# Patient Record
Sex: FEMALE | Race: WHITE | Employment: PART TIME | URBAN - METROPOLITAN AREA
[De-identification: names, ages, dates, MRNs, and addresses within clinical notes are randomized per-mention and may not be internally consistent; named-entity substitution may affect disease eponyms.]

---

## 2017-01-04 ENCOUNTER — OFFICE VISIT (OUTPATIENT)
Dept: OBGYN | Facility: CLINIC | Age: 57
End: 2017-01-04
Payer: COMMERCIAL

## 2017-01-04 VITALS
SYSTOLIC BLOOD PRESSURE: 118 MMHG | HEART RATE: 92 BPM | BODY MASS INDEX: 19.84 KG/M2 | HEIGHT: 63 IN | DIASTOLIC BLOOD PRESSURE: 68 MMHG | WEIGHT: 112 LBS

## 2017-01-04 DIAGNOSIS — Z90.710 S/P COMPLETE HYSTERECTOMY: ICD-10-CM

## 2017-01-04 DIAGNOSIS — R87.620 ASCUS WITH POSITIVE HIGH RISK HUMAN PAPILLOMAVIRUS OF VAGINA: Primary | ICD-10-CM

## 2017-01-04 DIAGNOSIS — Z98.890 S/P LEEP OF CERVIX: ICD-10-CM

## 2017-01-04 DIAGNOSIS — R87.811 ASCUS WITH POSITIVE HIGH RISK HUMAN PAPILLOMAVIRUS OF VAGINA: Primary | ICD-10-CM

## 2017-01-04 PROCEDURE — 57420 EXAM OF VAGINA W/SCOPE: CPT | Performed by: OBSTETRICS & GYNECOLOGY

## 2017-01-04 NOTE — NURSING NOTE
"Initial /68 mmHg  Pulse 92  Ht 5' 3\" (1.6 m)  Wt 112 lb (50.803 kg)  BMI 19.84 kg/m2  LMP 03/17/2008 Estimated body mass index is 19.84 kg/(m^2) as calculated from the following:    Height as of this encounter: 5' 3\" (1.6 m).    Weight as of this encounter: 112 lb (50.803 kg). .      "

## 2017-01-04 NOTE — PROGRESS NOTES
Lauren Tam is a 56 year old female who presents for repeat colposcopy, referred by Dr Harrison. Pap smear 2 months ago showed: ASC-US with HR HPV non- 16/18. The prior pap showed ASC-US with HR HPV 18 plus non 16.     Past Medical History   Diagnosis Date     Excessive or frequent menstruation      Cervical high risk HPV (human papillomavirus) test positive 1/16/2015 1/9/2015:Pap--NIL, +HR HPV type 18 with an additional HR HPV type identified as well. Plan [place]-      H/O colposcopy with cervical biopsy 6/5/2015     Bx - benign, ECC - suspicious for HSIL     Syncope      Depression      ASCUS with positive high risk HPV cervical 12/2/16     Family History   Problem Relation Age of Onset     Prostate Cancer Father      Aneurysm Father      addominal     HEART DISEASE Paternal Grandmother      Heart Failure Paternal Grandmother      CEREBROVASCULAR DISEASE Paternal Grandfather      Family History Negative Mother      Liver Disease Maternal Grandfather      HEART DISEASE Brother      HEART DISEASE Sister      HEART DISEASE Son      HEART DISEASE Daughter      HEART DISEASE Brother      HEART DISEASE Sister      HEART DISEASE Sister      HEART DISEASE Son        Previous history of abnormal paps?: Yes   History of cryotherapy (freezing)?: : No LEEP preceeded hysterectomy  History of veneral diseases: : No  Do you desire testing for any of these diseases? : No  History of genital warts:  No  Visible warts now?:  No  Previously treated? If so, how?:  No     Patient's last menstrual period was 03/17/2008.  Type of contraception: status post hysterectomy  Age at first sexual intercourse: 17  Number of sexual partners (lifetime): 10   History   Smoking status     Former Smoker -- 6 years     Types: Cigarettes     Quit date: 01/01/1984   Smokeless tobacco     Never Used     Comment: 23 years ago quit date     History of sexual abuse:  No  Allergies as of 01/04/2017 - reviewed 01/04/2017   Allergen Reaction Noted      Nkda [no known drug allergies]  03/02/2011        PROCEDURE:  Before the procedure, it was ensured that the patient was educated regarding the nature of her findings to date, the implications of them, and what was to be done. She has been made aware of the role of HPV, the natural history of infection, ways to minimize her future risk, the effect of HPV on the cervix, and treatment options available should they be indicated. The   pathophysiology of the cervix, including a discussion of squamous vs. endometrial cells, and squamous metaplasia have all been reviewed, using illustrations and sketches. The details of the colposcopic procedure were reviewed, as well as the risks of missed diagnoses, pain, infection and bleeding. All questions were answered before proceeding, and informed consent was therefore obtained.    Bimanual examination: was not done  Unenhanced examination of the cervix was normal without lesions.  Pap smear and endocervical sampling not obtained due to:    not due  Please refer to images section for details!  Pap repeated?:  No  SCJ seen?: s/p hysterectomy  ECC done?:  No  Lugol's solution used?:  Yes   Satisfactory examination?:  yes    Vaginal vault: normal to cursory inspection yes  Urethra normal?:  yes  Labia normal?:  yes  Perineum normal?:  yes  Rectum normal?:  yes    FINDINGS:  Please see image   Cervix: Absent  Vagina: no visible lesions and no concerning findings  Procedure: Colposcopy only.    Procedure summary: Patient tolerated procedure well     Assessment: Normal exam without visible pathology  (R87.620,  R87.811) ASCUS with positive high risk human papillomavirus of vagina  (primary encounter diagnosis)  She has had HPV 18 in the past that is now absent  (Z98.890) S/P LEEP of cervix  (Z90.710) S/P complete hysterectomy  Comment: [facility]  Plan: pap + HPV in 1 year        Plan: Pap and HPV in 1 year    Anurag Real

## 2017-01-17 ENCOUNTER — RADIANT APPOINTMENT (OUTPATIENT)
Dept: GENERAL RADIOLOGY | Facility: CLINIC | Age: 57
End: 2017-01-17
Attending: NURSE PRACTITIONER
Payer: COMMERCIAL

## 2017-01-17 ENCOUNTER — OFFICE VISIT (OUTPATIENT)
Dept: FAMILY MEDICINE | Facility: CLINIC | Age: 57
End: 2017-01-17
Payer: COMMERCIAL

## 2017-01-17 VITALS
TEMPERATURE: 98.5 F | BODY MASS INDEX: 19.42 KG/M2 | WEIGHT: 109.6 LBS | SYSTOLIC BLOOD PRESSURE: 100 MMHG | DIASTOLIC BLOOD PRESSURE: 66 MMHG | HEART RATE: 68 BPM

## 2017-01-17 DIAGNOSIS — K59.00 CONSTIPATION, UNSPECIFIED CONSTIPATION TYPE: Primary | ICD-10-CM

## 2017-01-17 LAB
ALBUMIN SERPL-MCNC: 3.6 G/DL (ref 3.4–5)
ALP SERPL-CCNC: 61 U/L (ref 40–150)
ALT SERPL W P-5'-P-CCNC: 16 U/L (ref 0–50)
ANION GAP SERPL CALCULATED.3IONS-SCNC: 4 MMOL/L (ref 3–14)
AST SERPL W P-5'-P-CCNC: 17 U/L (ref 0–45)
BASOPHILS # BLD AUTO: 0 10E9/L (ref 0–0.2)
BASOPHILS NFR BLD AUTO: 0.4 %
BILIRUB SERPL-MCNC: 0.5 MG/DL (ref 0.2–1.3)
BUN SERPL-MCNC: 14 MG/DL (ref 7–30)
CALCIUM SERPL-MCNC: 8.7 MG/DL (ref 8.5–10.1)
CHLORIDE SERPL-SCNC: 104 MMOL/L (ref 94–109)
CO2 SERPL-SCNC: 30 MMOL/L (ref 20–32)
CREAT SERPL-MCNC: 0.84 MG/DL (ref 0.52–1.04)
DIFFERENTIAL METHOD BLD: NORMAL
EOSINOPHIL # BLD AUTO: 0.1 10E9/L (ref 0–0.7)
EOSINOPHIL NFR BLD AUTO: 1.5 %
ERYTHROCYTE [DISTWIDTH] IN BLOOD BY AUTOMATED COUNT: 12.5 % (ref 10–15)
GFR SERPL CREATININE-BSD FRML MDRD: 69 ML/MIN/1.7M2
GLUCOSE SERPL-MCNC: 98 MG/DL (ref 70–99)
HCT VFR BLD AUTO: 45.2 % (ref 35–47)
HGB BLD-MCNC: 14.9 G/DL (ref 11.7–15.7)
LYMPHOCYTES # BLD AUTO: 2.1 10E9/L (ref 0.8–5.3)
LYMPHOCYTES NFR BLD AUTO: 30.4 %
MCH RBC QN AUTO: 31.2 PG (ref 26.5–33)
MCHC RBC AUTO-ENTMCNC: 33 G/DL (ref 31.5–36.5)
MCV RBC AUTO: 95 FL (ref 78–100)
MONOCYTES # BLD AUTO: 0.5 10E9/L (ref 0–1.3)
MONOCYTES NFR BLD AUTO: 6.7 %
NEUTROPHILS # BLD AUTO: 4.2 10E9/L (ref 1.6–8.3)
NEUTROPHILS NFR BLD AUTO: 61 %
PLATELET # BLD AUTO: 228 10E9/L (ref 150–450)
POTASSIUM SERPL-SCNC: 4.4 MMOL/L (ref 3.4–5.3)
PROT SERPL-MCNC: 6.6 G/DL (ref 6.8–8.8)
RBC # BLD AUTO: 4.78 10E12/L (ref 3.8–5.2)
SODIUM SERPL-SCNC: 138 MMOL/L (ref 133–144)
WBC # BLD AUTO: 6.8 10E9/L (ref 4–11)

## 2017-01-17 PROCEDURE — 74020 XR ABDOMEN 2 VW: CPT

## 2017-01-17 PROCEDURE — 85025 COMPLETE CBC W/AUTO DIFF WBC: CPT | Performed by: NURSE PRACTITIONER

## 2017-01-17 PROCEDURE — 99214 OFFICE O/P EST MOD 30 MIN: CPT | Performed by: NURSE PRACTITIONER

## 2017-01-17 PROCEDURE — 36415 COLL VENOUS BLD VENIPUNCTURE: CPT | Performed by: NURSE PRACTITIONER

## 2017-01-17 PROCEDURE — 80053 COMPREHEN METABOLIC PANEL: CPT | Performed by: NURSE PRACTITIONER

## 2017-01-17 NOTE — PATIENT INSTRUCTIONS
Start with a fleets enema    Then start Miralax 1 capful every night until clear     Lots of fluids     Will get labs today and notify you of results     Return to clinic if symptoms not improved in a week       Constipation (Adult)  Constipation means that you have bowel movements that are less frequent than usual. Stools often become very hard and difficult to pass.  Constipation is very common. At some point in life it affects almost everyone. Since everyone's bowel habits are different, what is constipation to one person may not be to another. Your healthcare provider may do tests to diagnose constipation. It depends on what he or she finds when evaluating you.    Symptoms of constipation include:    Abdominal pain    Bloating    Vomiting    Painful bowel movements    Itching, swelling, bleeding, or pain around the anus  Causes  Constipation can have many causes. These include:    Diet low in fiber    Too much dairy    Not drinking enough liquids    Lack of exercise or physical activity. This is especially true for older adults.    Changes in lifestyle or daily routine, including pregnancy, aging, work, and travel    Frequent use or misuse of laxatives    Ignoring the urge to have a bowel movement or delaying it until later    Medicines, such as certain prescription pain medicines, iron supplements, antacids, certain antidepressants, and calcium supplements    Diseases like irritable bowel syndrome, bowel obstructions, stroke, diabetes, thyroid disease, Parkinson disease, hemorrhoids, and colon cancer  Complications  Potential complications of constipation can include:    Hemorrhoids    Rectal bleeding from hemorrhoids or anal fissures (skin tears)    Hernias    Dependency on laxatives    Chronic constipation    Fecal impaction    Bowel obstruction or perforation  Home care  All treatment should be done after talking with your healthcare provider. This is especially true if you have another medical problems, are  taking prescription medicines, or are an older adult. Treatment most often involves lifestyle changes. You may also need medicines. Your healthcare provider will tell you which will work best for you. Follow the advice below to help avoid this problem in the future.  Lifestyle changes  These lifestyle changes can help prevent constipation:    Diet. Eat a high-fiber diet, with fresh fruit and vegetables, and reduce dairy intake, meats, and processed foods    Fluids. It's important to get enough fluids each day. Drink plenty of water when you eat more fiber. If you are on diet that limits the amount of fluid you can have, talk about this with your healthcare provider.    Regular exercise. Check with your healthcare provider first.  Medications  Take any medicines as directed. Some laxatives are safe to use only every now and then. Others can be taken on a regular basis. Talk with your doctor or pharmacist if you have questions.  Prescription pain medicines can cause constipation. If you are taking this kind of medicine, ask your healthcare provider if you should also take a stool softener.  Medicines you may take to treat constipation include:    Fiber supplements    Stool softeners    Laxatives    Enemas    Rectal suppositories  Follow-up care  Follow up with your healthcare provider if symptoms don't get better in the next few days. You may need to have more tests or see a specialist.  Call 911  Call 911 if any of these occur:    Trouble breathing    Stiff, rigid abdomen that is severely painful to touch    Confusion    Fainting or loss of consciousness    Rapid heart rate    Chest pain  When to seek medical advice  Call your healthcare provider right away if any of these occur:    Fever over 100.4 F (38 C)    Failure to resume normal bowel movements    Pain in your abdomen or back gets worse    Nausea or vomiting    Swelling in your abdomen    Blood in the stool    Black, tarry stool    Involuntary weight  loss    Weakness    8300-4343 [employer]. [address] [place] [address], [place] [address]. All rights reserved. This information is not intended as a substitute for professional medical care. Always follow your healthcare professional's instructions.        Treating Constipation  Constipation is a common and often uncomfortable problem. Constipation means you have bowel movements fewer than three times per week, or strain to pass hard, dry stool. It can last a short time. Or it can be a problem that never seems to go away. The good news is that it can often be treated and controlled.    Eat more fiber  One of the best ways to help treat constipation is to increase your fiber intake. You can do this either through diet or by using fiber supplements. Fiber (in whole grains, fruits, and vegetables) adds bulk and absorbs water to soften the stool. This helps the stool pass through the colon more easily. When you increase your fiber intake, do it slowly to avoid side effects such as bloating. Also increase the amount of water that you drink. Eating more of the following foods can add fiber to your diet.    High-fiber cereals    Whole grains, bran, and brown rice    Vegetables such as carrots, broccoli, and greens    Fresh fruits (especially apples, pears, and dried fruits like raisins and apricots)    Nuts and legumes (especially beans such as lentils, kidney beans, and lima beans)  Get physically active  Exercise helps improve the working of your colon which helps ease constipation. Try to get some physical activity every day. If you haven t been active for a while, talk to your health care provider before starting again.  Laxatives  Your health care provider may suggest an over-the-counter product to help ease your constipation. He or she may suggest the use of bulk-forming agents or laxatives. The use of laxatives, if used as directed, is common and safe. Follow directions carefully when using them. See your  health care provider for new-onset constipation, to rule out other causes such as medications or thyroid disease.    2450-7905 [employer]. [address] [place] [address], [place] [address]. All rights reserved. This information is not intended as a substitute for professional medical care. Always follow your healthcare professional's instructions.

## 2017-01-17 NOTE — MR AVS SNAPSHOT
After Visit Summary   1/17/2017    Lauren Tam    MRN: [id number]           Patient Information     Date Of Birth          1960        Visit Information        Provider Department      1/17/2017 11:00 AM Divya Sesay APRN [facility]        Today's Diagnoses     Constipation, unspecified constipation type    -  1     Diarrhea, unspecified type           Care Instructions    Start with a fleets enema    Then start Miralax 1 capful every night until clear     Lots of fluids     Will get labs today and notify you of results     Return to clinic if symptoms not improved in a week       Constipation (Adult)  Constipation means that you have bowel movements that are less frequent than usual. Stools often become very hard and difficult to pass.  Constipation is very common. At some point in life it affects almost everyone. Since everyone's bowel habits are different, what is constipation to one person may not be to another. Your healthcare provider may do tests to diagnose constipation. It depends on what he or she finds when evaluating you.    Symptoms of constipation include:    Abdominal pain    Bloating    Vomiting    Painful bowel movements    Itching, swelling, bleeding, or pain around the anus  Causes  Constipation can have many causes. These include:    Diet low in fiber    Too much dairy    Not drinking enough liquids    Lack of exercise or physical activity. This is especially true for older adults.    Changes in lifestyle or daily routine, including pregnancy, aging, work, and travel    Frequent use or misuse of laxatives    Ignoring the urge to have a bowel movement or delaying it until later    Medicines, such as certain prescription pain medicines, iron supplements, antacids, certain antidepressants, and calcium supplements    Diseases like irritable bowel syndrome, bowel obstructions, stroke, diabetes, thyroid disease, Parkinson disease, hemorrhoids, and  colon cancer  Complications  Potential complications of constipation can include:    Hemorrhoids    Rectal bleeding from hemorrhoids or anal fissures (skin tears)    Hernias    Dependency on laxatives    Chronic constipation    Fecal impaction    Bowel obstruction or perforation  Home care  All treatment should be done after talking with your healthcare provider. This is especially true if you have another medical problems, are taking prescription medicines, or are an older adult. Treatment most often involves lifestyle changes. You may also need medicines. Your healthcare provider will tell you which will work best for you. Follow the advice below to help avoid this problem in the future.  Lifestyle changes  These lifestyle changes can help prevent constipation:    Diet. Eat a high-fiber diet, with fresh fruit and vegetables, and reduce dairy intake, meats, and processed foods    Fluids. It's important to get enough fluids each day. Drink plenty of water when you eat more fiber. If you are on diet that limits the amount of fluid you can have, talk about this with your healthcare provider.    Regular exercise. Check with your healthcare provider first.  Medications  Take any medicines as directed. Some laxatives are safe to use only every now and then. Others can be taken on a regular basis. Talk with your doctor or pharmacist if you have questions.  Prescription pain medicines can cause constipation. If you are taking this kind of medicine, ask your healthcare provider if you should also take a stool softener.  Medicines you may take to treat constipation include:    Fiber supplements    Stool softeners    Laxatives    Enemas    Rectal suppositories  Follow-up care  Follow up with your healthcare provider if symptoms don't get better in the next few days. You may need to have more tests or see a specialist.  Call 911  Call 911 if any of these occur:    Trouble breathing    Stiff, rigid abdomen that is severely  painful to touch    Confusion    Fainting or loss of consciousness    Rapid heart rate    Chest pain  When to seek medical advice  Call your healthcare provider right away if any of these occur:    Fever over 100.4 F (38 C)    Failure to resume normal bowel movements    Pain in your abdomen or back gets worse    Nausea or vomiting    Swelling in your abdomen    Blood in the stool    Black, tarry stool    Involuntary weight loss    Weakness    2317-4790 [employer]. [address] [place] [address], [place] [address]. All rights reserved. This information is not intended as a substitute for professional medical care. Always follow your healthcare professional's instructions.        Treating Constipation  Constipation is a common and often uncomfortable problem. Constipation means you have bowel movements fewer than three times per week, or strain to pass hard, dry stool. It can last a short time. Or it can be a problem that never seems to go away. The good news is that it can often be treated and controlled.    Eat more fiber  One of the best ways to help treat constipation is to increase your fiber intake. You can do this either through diet or by using fiber supplements. Fiber (in whole grains, fruits, and vegetables) adds bulk and absorbs water to soften the stool. This helps the stool pass through the colon more easily. When you increase your fiber intake, do it slowly to avoid side effects such as bloating. Also increase the amount of water that you drink. Eating more of the following foods can add fiber to your diet.    High-fiber cereals    Whole grains, bran, and brown rice    Vegetables such as carrots, broccoli, and greens    Fresh fruits (especially apples, pears, and dried fruits like raisins and apricots)    Nuts and legumes (especially beans such as lentils, kidney beans, and lima beans)  Get physically active  Exercise helps improve the working of your colon which helps ease constipation. Try to  get some physical activity every day. If you haven t been active for a while, talk to your health care provider before starting again.  Laxatives  Your health care provider may suggest an over-the-counter product to help ease your constipation. He or she may suggest the use of bulk-forming agents or laxatives. The use of laxatives, if used as directed, is common and safe. Follow directions carefully when using them. See your health care provider for new-onset constipation, to rule out other causes such as medications or thyroid disease.    6449-8970 [employer]. [address] [place] [address]. All rights reserved. This information is not intended as a substitute for professional medical care. Always follow your healthcare professional's instructions.              Follow-ups after your visit        Who to contact     If you have questions or need follow up information about today's clinic visit or your schedule please contact [facility] directly at [phone].  Normal or non-critical lab and imaging results will be communicated to you by [employer], letter or phone within 4 business days after the clinic has received the results. If you do not hear from us within 7 days, please contact the clinic through [employer] or phone. If you have a critical or abnormal lab result, we will notify you by phone as soon as possible.  Submit refill requests through [employer] or call your pharmacy and they will forward the refill request to us. Please allow 3 business days for your refill to be completed.          Additional Information About Your Visit        [employer] Information     [employer] gives you secure access to your electronic health record. If you see a primary care provider, you can also send messages to your care team and make appointments. If you have questions, please call your primary care clinic.  If you do not have a primary care provider, please call [phone] and they will  assist you.        Care EveryWhere ID     This is your Care EveryWhere ID. This could be used by other organizations to access your [place] medical records  [id number]        Your Vitals Were     Pulse Temperature Last Period             68 98.5  F (36.9  C) (Tympanic) 03/17/2008          Blood Pressure from Last 3 Encounters:   01/17/17 100/66   01/04/17 118/68   12/02/16 121/76    Weight from Last 3 Encounters:   01/17/17 109 lb 9.6 oz (49.714 kg)   01/04/17 112 lb (50.803 kg)   12/02/16 109 lb (49.442 kg)              We Performed the Following     CBC with platelets and differential     Comprehensive metabolic panel (BMP + Alb, Alk Phos, ALT, AST, Total. Bili, TP)     XR Abdomen 2 Views        Primary Care Provider Office Phone # Fax #    Fausto Gregg [phone]       [facility] [address] [place] [address]        Thank you!     Thank you for choosing [facility]  for your care. Our goal is always to provide you with excellent care. Hearing back from our patients is one way we can continue to improve our services. Please take a few minutes to complete the written survey that you may receive in the mail after your visit with us. Thank you!             Your Updated Medication List - Protect others around you: Learn how to safely use, store and throw away your medicines at www.disposemymeds.org.          This list is accurate as of: 1/17/17 11:44 AM.  Always use your most recent med list.                   Brand Name Dispense Instructions for use    citalopram 20 MG tablet    celeXA    30 tablet    Take 1 tablet (20 mg) by mouth daily       EXCEDRIN PO      Take by mouth as needed       fluticasone 50 MCG/ACT spray    FLONASE    1 g    Spray 1-2 sprays into both nostrils daily       GLUCOSAMINE-CHONDROITIN PO      Take 2 tablets by mouth daily       MULTIVITAMIN TABS   OR      1 TABLET DAILY       NAPROSYN 500 MG tablet   Generic drug:  naproxen       Take 1 tablet (500 mg) by mouth 2 times daily as needed for moderate pain

## 2017-01-17 NOTE — NURSING NOTE
"Chief Complaint   Patient presents with     Headache     Vomiting     Generalized Body Aches     /66 mmHg  Pulse 68  Temp(Src) 98.5  F (36.9  C) (Tympanic)  Wt 109 lb 9.6 oz (49.714 kg)  LMP 03/17/2008 Estimated body mass index is 19.42 kg/(m^2) as calculated from the following:    Height as of 1/4/17: 5' 3\" (1.6 m).    Weight as of this encounter: 109 lb 9.6 oz (49.714 kg).  bp completed using cuff size: regular      Health Maintenance that is potentially due pending provider review:  NONE    N/a  Elijah Cerna M.A.    "

## 2017-01-17 NOTE — PROGRESS NOTES
SUBJECTIVE:                                                    Lauren Tam is a 56 year old female who presents to clinic today for the following health issues:    Concern - Nausea, Diarrhea, headaches     Onset: Thursday     Description:   Patient states she has been vomiting, having diarrhea, headaches and body aches since last Thursday    Intensity: moderate    Progression of Symptoms:  same    Accompanying Signs & Symptoms:  24 hour fever on Thursday. No taste buds and cant smell.  Has not been in contact with anyone that she knows was sick. Some Nausea.  No cramping, light headedness.       Previous history of similar problem:   None    Precipitating factors:   Worsened by: None    Alleviating factors:  Improved by: None        Therapies Tried and outcome: Peptobismal, gatorade.     Started with a headache - had for 24 hours  Temperature was 102 for approximately 24 hours on Thursday then broke   Is able to smell, just not taste  Normal daily bowel movements - last normal last Tuesday/Wednesday  No recent antibiotic use   No abdominal history     Diarrhea is soft/formed  Having approximately 3 a day  No blood in stool - more dark brown      Problem list and histories reviewed & adjusted, as indicated.  Additional history: as documented    Patient Active Problem List   Diagnosis     CARDIOVASCULAR SCREENING; LDL GOAL LESS THAN 160     Sebaceous cyst     S/P LEEP of cervix-CIN2     Advanced directives, counseling/discussion     Syncope     Severe episode of recurrent major depressive disorder, without psychotic features (H)     ASCUS with positive high risk human papillomavirus of vagina     S/P complete hysterectomy     Past Surgical History   Procedure Laterality Date     Surgical history of -   1967     T&A     Surgical history of -        PE Tubes x6 starting 1967     Surgical history of -   1979     Appy     Surgical history of -        D&C     Surgical history of -   1984     Tubal Ligation      Surgical history of -   1987     Arthroscopy (R) Knee     Colonoscopy  3/2/2011     COLONOSCOPY performed by CESAR GONZALEZ at [facility]     Leep tx, cervical  6/23/15     ELAINA 1/2 with indeterminate margin     Laparoscopic hysterectomy total, bilateral salpingo-oophorectomy, combined N/A 10/12/2015     Procedure: COMBINED LAPAROSCOPIC HYSTERECTOMY TOTAL, SALPINGO-OOPHORECTOMY;  Surgeon: Hunter Cruz MD;  Location: [facility]     Cystoscopy N/A 10/12/2015     Procedure: CYSTOSCOPY;  Surgeon: Hunter Cruz MD;  Location: [facility]       Social History   Substance Use Topics     Smoking status: Former Smoker -- 6 years     Types: Cigarettes     Quit date: 01/01/1984     Smokeless tobacco: Never Used      Comment: 23 years ago quit date     Alcohol Use: Yes      Comment: 3 drinks a weekly     Family History   Problem Relation Age of Onset     Prostate Cancer Father      Aneurysm Father      addominal     HEART DISEASE Paternal Grandmother      Heart Failure Paternal Grandmother      CEREBROVASCULAR DISEASE Paternal Grandfather      Family History Negative Mother      Liver Disease Maternal Grandfather      HEART DISEASE Brother      HEART DISEASE Sister      HEART DISEASE Son      HEART DISEASE Daughter      HEART DISEASE Brother      HEART DISEASE Sister      HEART DISEASE Sister      HEART DISEASE Son          Current Outpatient Prescriptions   Medication Sig Dispense Refill     citalopram (CELEXA) 20 MG tablet Take 1 tablet (20 mg) by mouth daily 30 tablet 11     Aspirin-Acetaminophen-Caffeine (EXCEDRIN PO) Take by mouth as needed        GLUCOSAMINE-CHONDROITIN PO Take 2 tablets by mouth daily       naproxen (NAPROSYN) 500 MG tablet Take 1 tablet (500 mg) by mouth 2 times daily as needed for moderate pain       fluticasone (FLONASE) 50 MCG/ACT nasal spray Spray 1-2 sprays into both nostrils daily 1 g 0     MULTIVITAMIN TABS   OR 1 TABLET DAILY       Allergies   Allergen Reactions     Nkda [No  Known Drug Allergies]        ROS:  Constitutional, HEENT, cardiovascular, pulmonary, gi and gu systems are negative, except as otherwise noted.    OBJECTIVE:                                                    /66 mmHg  Pulse 68  Temp(Src) 98.5  F (36.9  C) (Tympanic)  Wt 109 lb 9.6 oz (49.714 kg)  LMP 03/17/2008  Body mass index is 19.42 kg/(m^2).  GENERAL APPEARANCE: healthy, alert and mild distress  RESP: lungs clear to auscultation - no rales, rhonchi or wheezes  CV: regular rates and rhythm, normal S1 S2, no S3 or S4 and no murmur, click or rub  ABDOMEN: soft, tender all quadrants R>L, without hepatosplenomegaly or masses and bowel sounds normal    Diagnostic Test Results:  Results for orders placed or performed in visit on 01/17/17 (from the past 24 hour(s))   CBC with platelets and differential   Result Value Ref Range    WBC 6.8 4.0 - 11.0 10e9/L    RBC Count 4.78 3.8 - 5.2 10e12/L    Hemoglobin 14.9 11.7 - 15.7 g/dL    Hematocrit 45.2 35.0 - 47.0 %    MCV 95 78 - 100 fl    MCH 31.2 26.5 - 33.0 pg    MCHC 33.0 31.5 - 36.5 g/dL    RDW 12.5 10.0 - 15.0 %    Platelet Count 228 150 - 450 10e9/L    Diff Method Automated Method     % Neutrophils 61.0 %    % Lymphocytes 30.4 %    % Monocytes 6.7 %    % Eosinophils 1.5 %    % Basophils 0.4 %    Absolute Neutrophil 4.2 1.6 - 8.3 10e9/L    Absolute Lymphocytes 2.1 0.8 - 5.3 10e9/L    Absolute Monocytes 0.5 0.0 - 1.3 10e9/L    Absolute Eosinophils 0.1 0.0 - 0.7 10e9/L    Absolute Basophils 0.0 0.0 - 0.2 10e9/L          Abdominal x-ray read by Divya Lamb, DAVID CNP  - moderate amount of stool in right colon and scattered through out- discussed with patient  Comprehensive - pending   ASSESSMENT/PLAN:                                                      1. Constipation, unspecified constipation type  Nausea with diarrhea since Thursday, fever x 1 day. No vomiting. No blood in stool. Abdominal xray identifies a moderate amount of stool in right colon and  scattered through out   - XR Abdomen 2 Views  - CBC with platelets and differential  - Comprehensive metabolic panel (BMP + Alb, Alk Phos, ALT, AST, Total. Bili, TP)  - Fleets enema  - Start Miralax nightly until clear  - Push Fluids    See Patient Instructions    DAVID Caldwell [facility]

## 2017-02-02 ENCOUNTER — TELEPHONE (OUTPATIENT)
Dept: FAMILY MEDICINE | Facility: CLINIC | Age: 57
End: 2017-02-02

## 2017-02-02 DIAGNOSIS — J01.90 ACUTE SINUSITIS WITH SYMPTOMS > 10 DAYS: Primary | ICD-10-CM

## 2017-02-02 NOTE — TELEPHONE ENCOUNTER
Reason for call:  Patient reporting a symptom    Symptom or request: poss sinus infection     Duration (how long have symptoms been present): 6 days headache pressure behind eyes nausea no temp    Have you been treated for this before? Yes    Additional comments: very sleepy [place] in [place] [phone] is phone number     Phone Number patient can be reached at:  Home number on file [phone] (home)    Best Time:  soon    Can we leave a detailed message on this number:  YES    Call taken on 2/2/2017 at 10:56 AM by Gwen eRes

## 2017-02-02 NOTE — TELEPHONE ENCOUNTER
RN Sinusitis Treatment Protocol: ages 16 and up  Lauren Tam, a 56 year old female, is having symptoms reviewed for possible sinus infection.    ASSESSMENT/PLAN:  1.  Antibiotic treatment is indicated as onset of symptoms have been more than 10 days.  Amoxicillin-clavulanate 875mg/125mg orally twice daily or 500mg/125mg three times daily for 5 to 7 days.  2.  Education: ibuprofen or acetaminophen as directed on the bottle, over the counter decongestant, Nasal saline rinse, (Neti-pot or a nasal saline spray is preferred to Afrin nasal spray), Afrin nasal spray no longer than 5 days.  3.  Follow-up: Contact provider's triage RN if symptoms do not improve after 3 days of antibiotic treatment or if symptoms return after antibiotic therapy is complete.   4.  Patient verbalized understanding of this plan and is agreeable.    SUBJECTIVE:  Symptoms: Facial pain or pressure over the sinus areas, especially if worse with position change or cough, Nasal congestion and Change in sense of smell or lack of smell.     In addition notes: None   Shortness of breath: NO  Onset of symptoms was 2 week(s) ago.    Treatment measures tried include Fluids, OTC meds and Rest.   Course of illness is worsening.  Predisposing conditions include: None    Complicating Factors and Serious Symptoms:   Patient reports: NONE   Patient denies: Fever > 102.5  Orbital pain  Periorbital swelling or erythema  Severe facial swelling or erythema  Severe neck pain  This being the worse headache the patient has ever experienced  Vision changes  COPD (chronic obstructive pulmonary disease)    ALLERGIES:  Allergies   Allergen Reactions     Nkda [No Known Drug Allergies]      OBJECTIVE:  Weight: 0 lbs 0 oz    Encounter handled by: Nurse Triage.     Connie Oliver RN

## 2017-03-02 ENCOUNTER — OFFICE VISIT (OUTPATIENT)
Dept: FAMILY MEDICINE | Facility: CLINIC | Age: 57
End: 2017-03-02
Payer: COMMERCIAL

## 2017-03-02 ENCOUNTER — RADIANT APPOINTMENT (OUTPATIENT)
Dept: GENERAL RADIOLOGY | Facility: CLINIC | Age: 57
End: 2017-03-02
Attending: PHYSICIAN ASSISTANT
Payer: COMMERCIAL

## 2017-03-02 VITALS
HEART RATE: 80 BPM | WEIGHT: 116.6 LBS | DIASTOLIC BLOOD PRESSURE: 72 MMHG | HEIGHT: 63 IN | TEMPERATURE: 97.8 F | BODY MASS INDEX: 20.66 KG/M2 | SYSTOLIC BLOOD PRESSURE: 116 MMHG

## 2017-03-02 DIAGNOSIS — M79.641 PAIN OF RIGHT HAND: Primary | ICD-10-CM

## 2017-03-02 DIAGNOSIS — M79.641 PAIN OF RIGHT HAND: ICD-10-CM

## 2017-03-02 PROCEDURE — 99214 OFFICE O/P EST MOD 30 MIN: CPT | Performed by: PHYSICIAN ASSISTANT

## 2017-03-02 PROCEDURE — 73130 X-RAY EXAM OF HAND: CPT | Mod: RT

## 2017-03-02 ASSESSMENT — ENCOUNTER SYMPTOMS
NERVOUS/ANXIOUS: 0
DIAPHORESIS: 0
HEARTBURN: 0
BLOOD IN STOOL: 0
PHOTOPHOBIA: 0
ABDOMINAL PAIN: 0
SENSORY CHANGE: 0
TINGLING: 0
DEPRESSION: 0
NEUROLOGICAL NEGATIVE: 1
CONSTIPATION: 0
PALPITATIONS: 0
WEIGHT LOSS: 0
ORTHOPNEA: 0
FREQUENCY: 0
SEIZURES: 0
HEADACHES: 0
SPUTUM PRODUCTION: 0
LOSS OF CONSCIOUSNESS: 0
DOUBLE VISION: 0
COUGH: 0
EYE REDNESS: 0
DIARRHEA: 0
FEVER: 0
DYSURIA: 0
FOCAL WEAKNESS: 0
SHORTNESS OF BREATH: 0
VOMITING: 0
HALLUCINATIONS: 0
SORE THROAT: 0
CHILLS: 0
FALLS: 0
HEMOPTYSIS: 0
DIZZINESS: 0
BACK PAIN: 0
INSOMNIA: 0
WEAKNESS: 0
EYE PAIN: 0
NAUSEA: 0
EYE DISCHARGE: 0
NECK PAIN: 0
BLURRED VISION: 0
WHEEZING: 0
MYALGIAS: 0

## 2017-03-02 ASSESSMENT — LIFESTYLE VARIABLES: SUBSTANCE_ABUSE: 0

## 2017-03-02 NOTE — PROGRESS NOTES
HPI    SUBJECTIVE:                                                    Lauren Tam is a 56 year old female who presents to clinic today for right hand pain and swelling since yesterday. She reports pain started a few hours after returning from work yesterday. Hand become swollen and red two hours later. She reports no pain during rest but 8/10 with movement. She works scanning items all day during work. She has a history of bilateral carpal tunnel and osteoarthritis for which she uses a brace at work. She has been icing hand along with ibuprofen with minimal relief. No fever, numbness, tingling or trauma. Decreased strength due to pain and previous joint problems.       Musculoskeletal problem/pain      Duration: since yesterday afternoon    Description  Location: top of right hand    Intensity:  severe, 8/10    Accompanying signs and symptoms: warmth, swelling and redness    History  Previous similar problem: YES- in right forearm a couple years ago  Previous evaluation:  none    Precipitating or alleviating factors:  Trauma or overuse: YES  Aggravating factors include: using hand    Therapies tried and outcome: ice and NSAID - motrin, brace     Problem list and histories reviewed & adjusted, as indicated.  Additional history:     Patient Active Problem List   Diagnosis     CARDIOVASCULAR SCREENING; LDL GOAL LESS THAN 160     Sebaceous cyst     S/P LEEP of cervix-CIN2     Advanced directives, counseling/discussion     Syncope     Severe episode of recurrent major depressive disorder, without psychotic features (H)     ASCUS with positive high risk human papillomavirus of vagina     S/P complete hysterectomy     Past Surgical History   Procedure Laterality Date     Surgical history of -   1967     T&A     Surgical history of -        PE Tubes x6 starting 1967     Surgical history of -   1979     Appy     Surgical history of -        D&C     Surgical history of -   1984     Tubal Ligation     Surgical history of  - 1987     Arthroscopy (R) Knee     Colonoscopy  3/2/2011     COLONOSCOPY performed by CESAR GONZALEZ at [facility]     Leep tx, cervical  6/23/15     ELAINA 1/2 with indeterminate margin     Laparoscopic hysterectomy total, bilateral salpingo-oophorectomy, combined N/A 10/12/2015     Procedure: COMBINED LAPAROSCOPIC HYSTERECTOMY TOTAL, SALPINGO-OOPHORECTOMY;  Surgeon: Hunter Cruz MD;  Location: [facility]     Cystoscopy N/A 10/12/2015     Procedure: CYSTOSCOPY;  Surgeon: Hunter Cruz MD;  Location: [facility]       Social History   Substance Use Topics     Smoking status: Former Smoker     Years: 6.00     Types: Cigarettes     Quit date: 1/1/1984     Smokeless tobacco: Never Used      Comment: 23 years ago quit date     Alcohol use Yes      Comment: 3 drinks a weekly     Family History   Problem Relation Age of Onset     Prostate Cancer Father      Aneurysm Father      addominal     HEART DISEASE Paternal Grandmother      Heart Failure Paternal Grandmother      CEREBROVASCULAR DISEASE Paternal Grandfather      Family History Negative Mother      Liver Disease Maternal Grandfather      HEART DISEASE Brother      HEART DISEASE Sister      HEART DISEASE Son      HEART DISEASE Daughter      HEART DISEASE Brother      HEART DISEASE Sister      HEART DISEASE Sister      HEART DISEASE Son          Current Outpatient Prescriptions   Medication Sig Dispense Refill     citalopram (CELEXA) 20 MG tablet Take 1 tablet (20 mg) by mouth daily 30 tablet 11     Aspirin-Acetaminophen-Caffeine (EXCEDRIN PO) Take by mouth as needed        GLUCOSAMINE-CHONDROITIN PO Take 2 tablets by mouth daily       naproxen (NAPROSYN) 500 MG tablet Take 1 tablet (500 mg) by mouth 2 times daily as needed for moderate pain       fluticasone (FLONASE) 50 MCG/ACT nasal spray Spray 1-2 sprays into both nostrils daily 1 g 0     MULTIVITAMIN TABS   OR 1 TABLET DAILY       Allergies   Allergen Reactions     Nkda [No Known Drug Allergies]       BP Readings from Last 3 Encounters:   03/02/17 116/72   01/17/17 100/66   01/04/17 118/68    Wt Readings from Last 3 Encounters:   03/02/17 116 lb 9.6 oz (52.9 kg)   01/17/17 109 lb 9.6 oz (49.7 kg)   01/04/17 112 lb (50.8 kg)            Labs reviewed in EPIC    Reviewed and updated as needed this visit by clinical staff    Reviewed and updated as needed this visit by Provider      Review of Systems   Constitutional: Negative for chills, diaphoresis, fever, malaise/fatigue and weight loss.   HENT: Negative for congestion, ear discharge, ear pain, hearing loss, nosebleeds and sore throat.    Eyes: Negative for blurred vision, double vision, photophobia, pain, discharge and redness.   Respiratory: Negative for cough, hemoptysis, sputum production, shortness of breath and wheezing.    Cardiovascular: Negative for chest pain, palpitations, orthopnea and leg swelling.   Gastrointestinal: Negative for abdominal pain, blood in stool, constipation, diarrhea, heartburn, melena, nausea and vomiting.   Genitourinary: Negative.  Negative for dysuria, frequency and urgency.   Musculoskeletal: Positive for joint pain. Negative for back pain, falls, myalgias and neck pain.   Skin: Negative for itching and rash.   Neurological: Negative.  Negative for dizziness, tingling, sensory change, focal weakness, seizures, loss of consciousness, weakness and headaches.   Endo/Heme/Allergies: Negative.    Psychiatric/Behavioral: Negative for depression, hallucinations, substance abuse and suicidal ideas. The patient is not nervous/anxious and does not have insomnia.        Physical Exam   Constitutional: She is oriented to person, place, and time and well-developed, well-nourished, and in no distress. No distress.   HENT:   Head: Normocephalic and atraumatic.   Right Ear: External ear normal.   Left Ear: External ear normal.   Nose: Nose normal.   Eyes: Conjunctivae and EOM are normal. Pupils are equal, round, and reactive to light.  Right eye exhibits no discharge. Left eye exhibits no discharge. No scleral icterus.   Neck: Normal range of motion. Neck supple. No JVD present. No tracheal deviation present. No thyromegaly present.   Cardiovascular: Normal rate, regular rhythm, normal heart sounds and intact distal pulses.  Exam reveals no gallop and no friction rub.    No murmur heard.  Pulmonary/Chest: Effort normal and breath sounds normal. No stridor. No respiratory distress. She has no wheezes. She has no rales. She exhibits no tenderness.   Abdominal: Soft. Bowel sounds are normal. She exhibits no distension and no mass. There is no tenderness. There is no rebound and no guarding.   Musculoskeletal: Normal range of motion. She exhibits no edema or tenderness.        Right wrist: She exhibits swelling. She exhibits normal range of motion, no tenderness, no bony tenderness and no deformity.        Arms:  Pain with active range of motion  Pain with resisted extension of right index finger  Central 2 cm by 3 cm area of erythema and edema on right dorsal hand    Lymphadenopathy:     She has no cervical adenopathy.   Neurological: She is alert and oriented to person, place, and time. She has normal reflexes. No cranial nerve deficit. She exhibits normal muscle tone. Gait normal.   Skin: Skin is warm and dry. No rash noted. She is not diaphoretic. No erythema. No pallor.   Psychiatric: Mood, memory, affect and judgment normal.       (M79.641) Pain of right hand  (primary encounter diagnosis)  Comment:   Plan: XR Hand Right G/E 3 Views          X-ray shows arthritic changes which may be causing some of pain and swelling. Naproxen 500mg bid to relieve inflammation. Given note 03/02-03/04 to be off work and allow area to rest. Call or return to clinic with new or worsening symptoms.      Abbie Epley, PA-S    I have examined this patient and reviewed above note  Eric Blum MS, PA-C

## 2017-03-02 NOTE — NURSING NOTE
"Chief Complaint   Patient presents with     Hand Problem       Initial /72 (BP Location: Right arm, Patient Position: Chair, Cuff Size: Adult Regular)  Pulse 80  Temp 97.8  F (36.6  C) (Tympanic)  Ht 5' 3\" (1.6 m)  Wt 116 lb 9.6 oz (52.9 kg)  LMP 03/17/2008  BMI 20.65 kg/m2 Estimated body mass index is 20.65 kg/(m^2) as calculated from the following:    Height as of this encounter: 5' 3\" (1.6 m).    Weight as of this encounter: 116 lb 9.6 oz (52.9 kg).  Medication Reconciliation: complete      "

## 2017-03-02 NOTE — LETTER
[facility]  [address] [place] [address]  Phone: [phone]  Fax: [phone]    March 2, 2017        Lauren Tam  [address] [place] [id number]          To whom it may concern:    RE: Lauren Tam    Patient was seen and treated today at our clinic and missed work 3/2-4 due to injury.    Please contact me for questions or concerns.      Sincerely,        Eric Blum PA-C

## 2017-03-02 NOTE — MR AVS SNAPSHOT
"              After Visit Summary   3/2/2017    Lauren Tam    MRN: [id number]           Patient Information     Date Of Birth          1960        Visit Information        Provider Department      3/2/2017 10:00 AM Eric Blum PA-C [facility]        Today's Diagnoses     Pain of right hand    -  1       Follow-ups after your visit        Follow-up notes from your care team     Return if symptoms worsen or fail to improve.      Who to contact     If you have questions or need follow up information about today's clinic visit or your schedule please contact [facility] directly at [phone].  Normal or non-critical lab and imaging results will be communicated to you by [facility] labshart, letter or phone within 4 business days after the clinic has received the results. If you do not hear from us within 7 days, please contact the clinic through [facility] labshart or phone. If you have a critical or abnormal lab result, we will notify you by phone as soon as possible.  Submit refill requests through [facility] or call your pharmacy and they will forward the refill request to us. Please allow 3 business days for your refill to be completed.          Additional Information About Your Visit        MyChart Information     [facility] gives you secure access to your electronic health record. If you see a primary care provider, you can also send messages to your care team and make appointments. If you have questions, please call your primary care clinic.  If you do not have a primary care provider, please call [phone] and they will assist you.        Care EveryWhere ID     This is your Care EveryWhere ID. This could be used by other organizations to access your [facility] medical records  [id number]        Your Vitals Were     Pulse Temperature Height Last Period BMI (Body Mass Index)       80 97.8  F (36.6  C) (Tympanic) 5' 3\" (1.6 m) 03/17/2008 20.65 kg/m2        Blood Pressure from Last 3 " Encounters:   03/02/17 116/72   01/17/17 100/66   01/04/17 118/68    Weight from Last 3 Encounters:   03/02/17 116 lb 9.6 oz (52.9 kg)   01/17/17 109 lb 9.6 oz (49.7 kg)   01/04/17 112 lb (50.8 kg)               Primary Care Provider Office Phone # Fax #    Fausto Gregg [phone]       [facility] [address] [address]        Thank you!     Thank you for choosing [facility]  for your care. Our goal is always to provide you with excellent care. Hearing back from our patients is one way we can continue to improve our services. Please take a few minutes to complete the written survey that you may receive in the mail after your visit with us. Thank you!             Your Updated Medication List - Protect others around you: Learn how to safely use, store and throw away your medicines at www.disposemymeds.org.          This list is accurate as of: 3/2/17 11:31 AM.  Always use your most recent med list.                   Brand Name Dispense Instructions for use    citalopram 20 MG tablet    celeXA    30 tablet    Take 1 tablet (20 mg) by mouth daily       EXCEDRIN PO      Take by mouth as needed       fluticasone 50 MCG/ACT spray    FLONASE    1 g    Spray 1-2 sprays into both nostrils daily       GLUCOSAMINE-CHONDROITIN PO      Take 2 tablets by mouth daily       MULTIVITAMIN TABS   OR      1 TABLET DAILY       NAPROSYN 500 MG tablet   Generic drug:  naproxen      Take 1 tablet (500 mg) by mouth 2 times daily as needed for moderate pain

## 2017-03-07 ENCOUNTER — TELEPHONE (OUTPATIENT)
Dept: FAMILY MEDICINE | Facility: CLINIC | Age: 57
End: 2017-03-07

## 2017-03-08 ASSESSMENT — PATIENT HEALTH QUESTIONNAIRE - PHQ9: SUM OF ALL RESPONSES TO PHQ QUESTIONS 1-9: 1

## 2017-10-31 ENCOUNTER — TELEPHONE (OUTPATIENT)
Dept: OBGYN | Facility: CLINIC | Age: 57
End: 2017-10-31

## 2017-10-31 NOTE — LETTER
October 31, 2017      Lauren CHRISTOPHER Anel  [address]   [facility] [address]    Dear MsLaxmi,      This letter is to remind you that you are due for your Mammogram.  Please call [phone] to schedule your appointment at your earliest convenience.     If you have completed the tests outside of [facility], please have the results forwarded to our office. We will update the chart for your primary Physician to review before your next annual physical.     Sincerely,      Anurag Real MD

## 2017-10-31 NOTE — TELEPHONE ENCOUNTER
Panel Management Review        Composite cancer screening  Chart review shows that this patient is due/due soon for the following Mammogram  Summary:    Patient is due/failing the following:   MAMMOGRAM    Action needed:   Patient needs office visit for mammogram.    Type of outreach:    Sent [facility] message.    Questions for provider review:    None                                                                                                                                    FABBY LIU MA       Chart routed to none .

## 2017-11-07 ENCOUNTER — TRANSFERRED RECORDS (OUTPATIENT)
Dept: HEALTH INFORMATION MANAGEMENT | Facility: CLINIC | Age: 57
End: 2017-11-07

## 2017-11-14 ENCOUNTER — OFFICE VISIT (OUTPATIENT)
Dept: OBGYN | Facility: CLINIC | Age: 57
End: 2017-11-14
Payer: COMMERCIAL

## 2017-11-14 VITALS
DIASTOLIC BLOOD PRESSURE: 74 MMHG | WEIGHT: 112 LBS | SYSTOLIC BLOOD PRESSURE: 132 MMHG | HEIGHT: 63 IN | HEART RATE: 78 BPM | BODY MASS INDEX: 19.84 KG/M2

## 2017-11-14 DIAGNOSIS — Z01.419 WELL FEMALE EXAM WITH ROUTINE GYNECOLOGICAL EXAM: Primary | ICD-10-CM

## 2017-11-14 DIAGNOSIS — R87.811 ASCUS WITH POSITIVE HIGH RISK HUMAN PAPILLOMAVIRUS OF VAGINA: ICD-10-CM

## 2017-11-14 DIAGNOSIS — R87.620 ASCUS WITH POSITIVE HIGH RISK HUMAN PAPILLOMAVIRUS OF VAGINA: ICD-10-CM

## 2017-11-14 PROCEDURE — 88175 CYTOPATH C/V AUTO FLUID REDO: CPT | Performed by: OBSTETRICS & GYNECOLOGY

## 2017-11-14 PROCEDURE — 99396 PREV VISIT EST AGE 40-64: CPT | Performed by: OBSTETRICS & GYNECOLOGY

## 2017-11-14 PROCEDURE — G0476 HPV COMBO ASSAY CA SCREEN: HCPCS | Performed by: OBSTETRICS & GYNECOLOGY

## 2017-11-14 NOTE — NURSING NOTE
"Initial /74 (BP Location: Left arm, Patient Position: Chair, Cuff Size: Adult Regular)  Pulse 78  Ht 5' 3\" (1.6 m)  Wt 112 lb (50.8 kg)  LMP 03/17/2008  Breastfeeding? No  BMI 19.84 kg/m2 Estimated body mass index is 19.84 kg/(m^2) as calculated from the following:    Height as of this encounter: 5' 3\" (1.6 m).    Weight as of this encounter: 112 lb (50.8 kg). .    Shayla Preston    "

## 2017-11-14 NOTE — MR AVS SNAPSHOT
After Visit Summary   11/14/2017    Lauren Tam    MRN: [id number]           Patient Information     Date Of Birth          1960        Visit Information        Provider Department      11/14/2017 1:00 PM Hunter Cruz MD [facility]        Today's Diagnoses     Well female exam with routine gynecological exam    -  1    ASCUS with positive high risk human papillomavirus of vagina          Care Instructions      Preventive Health Recommendations  Female Ages 50 - 64    Yearly exam: See your health care provider every year in order to  o Review health changes.   o Discuss preventive care.    o Review your medicines if your doctor has prescribed any.      Get a Pap test every three years (unless you have an abnormal result and your provider advises testing more often).    If you get Pap tests with HPV test, you only need to test every 5 years, unless you have an abnormal result.     You do not need a Pap test if your uterus was removed (hysterectomy) and you have not had cancer.    You should be tested each year for STDs (sexually transmitted diseases) if you're at risk.     Have a mammogram every 1 to 2 years.    Have a colonoscopy at age 50, or have a yearly FIT test (stool test). These exams screen for colon cancer.      Have a cholesterol test every 5 years, or more often if advised.    Have a diabetes test (fasting glucose) every three years. If you are at risk for diabetes, you should have this test more often.     If you are at risk for osteoporosis (brittle bone disease), think about having a bone density scan (DEXA).    Shots: Get a flu shot each year. Get a tetanus shot every 10 years.    Nutrition:     Eat at least 5 servings of fruits and vegetables each day.    Eat whole-grain bread, whole-wheat pasta and brown rice instead of white grains and rice.    Talk to your provider about Calcium and Vitamin D.     Lifestyle    Exercise at least 150 minutes a week  (30 minutes a day, 5 days a week). This will help you control your weight and prevent disease.    Limit alcohol to one drink per day.    No smoking.     Wear sunscreen to prevent skin cancer.     See your dentist every six months for an exam and cleaning.    See your eye doctor every 1 to 2 years.            Follow-ups after your visit        Your next 10 appointments already scheduled     Nov 29, 2017 10:40 AM CST   Pre-Op physical with Fausto Gregg MD   [facility] ([facility])    [address] [place] [id number] [phone]            Dec 05, 2017   Procedure with Eric Berry MD   [facility] (--)    [address] [facility] [address] [phone]           The medical center is located at [address] [facility]. (between [address] in [place], [place] miles north of [place]).              Future tests that were ordered for you today     Open Future Orders        Priority Expected Expires Ordered    *MA Screening Digital Bilateral Routine  11/14/2018 11/14/2017            Who to contact     If you have questions or need follow up information about today's clinic visit or your schedule please contact [facility] directly at [phone].  Normal or non-critical lab and imaging results will be communicated to you by [employer], letter or phone within 4 business days after the clinic has received the results. If you do not hear from us within 7 days, please contact the clinic through [employer] or phone. If you have a critical or abnormal lab result, we will notify you by phone as soon as possible.  Submit refill requests through [employer] or call your pharmacy and they will forward the refill request to us. Please allow 3 business days for your refill to be completed.          Additional Information About Your Visit        [employer] Information     [employer] gives you secure access to your electronic health record.  "If you see a primary care provider, you can also send messages to your care team and make appointments. If you have questions, please call your primary care clinic.  If you do not have a primary care provider, please call [phone] and they will assist you.        Care EveryWhere ID     This is your Care EveryWhere ID. This could be used by other organizations to access your [facility] medical records  [id number]        Your Vitals Were     Pulse Height Last Period Breastfeeding? BMI (Body Mass Index)       78 5' 3\" (1.6 m) 03/17/2008 No 19.84 kg/m2        Blood Pressure from Last 3 Encounters:   11/14/17 132/74   03/02/17 116/72   01/17/17 100/66    Weight from Last 3 Encounters:   11/14/17 112 lb (50.8 kg)   03/02/17 116 lb 9.6 oz (52.9 kg)   01/17/17 109 lb 9.6 oz (49.7 kg)              We Performed the Following     Pap imaged thin layer screen with HPV - recommended age 30 - 65 years (select HPV order below)        Primary Care Provider Office Phone # Fax #    Fausto Gregg [phone] [address] [place], [place] [address]        Equal Access to Services     MOHAMUD LOAIZA : Hadii aad ku hadasho Soomaali, waaxda luqadaha, qaybta kaalmada adeegyada, jaqui hoang. So [facility] [phone].    ATENCIÓN: Si habla español, tiene a kauffman disposición servicios gratuitos de asistencia lingüística. Llame al [phone].    We comply with applicable federal civil rights laws and [place] laws. We do not discriminate on the basis of race, color, national origin, age, disability, sex, sexual orientation, or gender identity.            Thank you!     Thank you for choosing [facility]  for your care. Our goal is always to provide you with excellent care. Hearing back from our patients is one way we can continue to improve our services. Please take a few minutes to complete the written survey that you may receive in the mail after your visit with us. Thank " you!             Your Updated Medication List - Protect others around you: Learn how to safely use, store and throw away your medicines at www.disposemymeds.org.          This list is accurate as of: 11/14/17  1:17 PM.  Always use your most recent med list.                   Brand Name Dispense Instructions for use Diagnosis    citalopram 20 MG tablet    celeXA    30 tablet    Take 1 tablet (20 mg) by mouth daily    Major depressive disorder, single episode, mild (H)       EXCEDRIN PO      Take by mouth as needed        fluticasone 50 MCG/ACT spray    FLONASE    1 g    Spray 1-2 sprays into both nostrils daily    Acute sinusitis with symptoms > 10 days       GLUCOSAMINE-CHONDROITIN PO      Take 2 tablets by mouth daily        MULTIVITAMIN TABS   OR      1 TABLET DAILY        NAPROSYN 500 MG tablet   Generic drug:  naproxen      Take 1 tablet (500 mg) by mouth 2 times daily as needed for moderate pain    Cervicalgia

## 2017-11-14 NOTE — PROGRESS NOTES
SUBJECTIVE:   CC: Lauren Tam is an 57 year old woman who presents for preventive health visit.     Healthy Habits:    Do you get at least three servings of calcium containing foods daily (dairy, green leafy vegetables, etc.)? yes    Amount of exercise or daily activities, outside of work: 0 day(s) per week    Problems taking medications regularly No    Medication side effects: No    Have you had an eye exam in the past two years? yes    Do you see a dentist twice per year? no    Do you have sleep apnea, excessive snoring or daytime drowsiness?no    No concerns.       Today's PHQ-2 Score: PHQ-2 ( 1999 Pf[employer]) 11/14/2017 12/2/2016   Q1: Little interest or pleasure in doing things 0 0   Q2: Feeling down, depressed or hopeless 0 0   PHQ-2 Score 0 0       Abuse: Current or Past(Physical, Sexual or Emotional)- No  Do you feel safe in your environment - Yes  Social History   Substance Use Topics     Smoking status: Former Smoker     Years: 6.00     Types: Cigarettes     Quit date: 1/1/1984     Smokeless tobacco: Never Used      Comment: 23 years ago quit date     Alcohol use Yes      Comment: 3 drinks a weekly     The patient does not drink >3 drinks per day nor >7 drinks per week.    Reviewed orders with patient.  Reviewed health maintenance and updated orders accordingly - Yes  Current Outpatient Prescriptions   Medication Sig Dispense Refill     citalopram (CELEXA) 20 MG tablet Take 1 tablet (20 mg) by mouth daily 30 tablet 11     Aspirin-Acetaminophen-Caffeine (EXCEDRIN PO) Take by mouth as needed        GLUCOSAMINE-CHONDROITIN PO Take 2 tablets by mouth daily       naproxen (NAPROSYN) 500 MG tablet Take 1 tablet (500 mg) by mouth 2 times daily as needed for moderate pain       fluticasone (FLONASE) 50 MCG/ACT nasal spray Spray 1-2 sprays into both nostrils daily 1 g 0     MULTIVITAMIN TABS   OR 1 TABLET DAILY       Allergies   Allergen Reactions     Nkda [No Known Drug Allergies]        Patient over age 50,  mutual decision to screen reflected in health maintenance.      Pertinent mammograms are reviewed under the imaging tab.  History of abnormal Pap smear: YES - updated in Problem List and Health Maintenance accordingly    Reviewed and updated as needed this visit by clinical staffTobacco  Allergies  Med Hx  Surg Hx  Fam Hx  Soc Hx        Reviewed and updated as needed this visit by Provider        Past Medical History:   Diagnosis Date     ASCUS with positive high risk HPV cervical 16     Cervical high risk HPV (human papillomavirus) test positive 2015:Pap--NIL, +HR HPV type 18 with an additional HR HPV type identified as well. Plan Tiger-      Depression      Excessive or frequent menstruation      H/O colposcopy with cervical biopsy 2015    Bx - benign, ECC - suspicious for HSIL     Status post colposcopy 17    visually normal; no biopsies taken     Syncope       Past Surgical History:   Procedure Laterality Date     COLONOSCOPY  3/2/2011    COLONOSCOPY performed by CESAR GONZALEZ at [facility]     CYSTOSCOPY N/A 10/12/2015    Procedure: CYSTOSCOPY;  Surgeon: Hunter Cruz MD;  Location: [facility]     LAPAROSCOPIC HYSTERECTOMY TOTAL, BILATERAL SALPINGO-OOPHORECTOMY, COMBINED N/A 10/12/2015    Procedure: COMBINED LAPAROSCOPIC HYSTERECTOMY TOTAL, SALPINGO-OOPHORECTOMY;  Surgeon: Hunter Cruz MD;  Location: [facility]     LEEP TX, CERVICAL  6/23/15    ELAINA 1/2 with indeterminate margin     SURGICAL HISTORY OF -       T&A     SURGICAL HISTORY OF -       PE Tubes x6 starting      SURGICAL HISTORY OF -       Appy     SURGICAL HISTORY OF -       D&C     SURGICAL HISTORY OF -       Tubal Ligation     SURGICAL HISTORY OF -       Arthroscopy (R) Knee     Obstetric History       T3      L3     SAB1   TAB0   Ectopic0   Multiple0   Live Births0       # Outcome Date GA Lbr Kyle/2nd Weight Sex Delivery Anes PTL Lv   5 Term            4 Term       "      3 Term            2 TAB            1 SAB               Obstetric Comments    x 3       ROS:  C: NEGATIVE for fever, chills, change in weight  I: NEGATIVE for worrisome rashes, moles or lesions  E: NEGATIVE for vision changes or irritation  ENT: NEGATIVE for ear, mouth and throat problems  R: NEGATIVE for significant cough or SOB  B: NEGATIVE for masses, tenderness or discharge  CV: NEGATIVE for chest pain, palpitations or peripheral edema  GI: NEGATIVE for nausea, abdominal pain, heartburn, or change in bowel habits  : NEGATIVE for unusual urinary or vaginal symptoms. No vaginal bleeding.  M: NEGATIVE for significant arthralgias or myalgia  N: NEGATIVE for weakness, dizziness or paresthesias  P: NEGATIVE for changes in mood or affect     OBJECTIVE:   /74 (BP Location: Left arm, Patient Position: Chair, Cuff Size: Adult Regular)  Pulse 78  Ht 5' 3\" (1.6 m)  Wt 112 lb (50.8 kg)  LMP 2008  Breastfeeding? No  BMI 19.84 kg/m2  EXAM:  GENERAL APPEARANCE: healthy, alert and no distress  HENT: atraumatic, normocephalic  NECK: no adenopathy, no asymmetry, masses, or scars and thyroid normal to palpation  RESP: lungs clear to auscultation - no rales, rhonchi or wheezes  BREAST: normal without masses, tenderness or nipple discharge and no palpable axillary masses or adenopathy  CV: regular rate and rhythm, normal S1 S2, no S3 or S4, no murmur, click or rub, no peripheral edema and peripheral pulses strong  ABDOMEN: soft, nontender, no hepatosplenomegaly, no masses and bowel sounds normal  MS: no musculoskeletal defects are noted and gait is age appropriate without ataxia  PELVIC: Normal external female genitalia.  No external lesions, normal hair distribution, no adenopathy. Speculum exam reveals atrophic  vaginal epithelium with no abnormal discharge. Vaginal cuff appears smooth, pink, with no visible lesions. Bimanual exam reveals surgically absent uterus. No adnexal masses or tenderness.  SKIN: " "no suspicious lesions or rashes  NEURO: Normal strength and tone, sensory exam grossly normal, mentation intact and speech normal  PSYCH: mentation appears normal and affect normal/bright    ASSESSMENT/PLAN:   1. ASCUS with positive high risk human papillomavirus of vagina    - Pap imaged thin layer screen with HPV - recommended age 30 - 65 years (select HPV order below)  - *MA Screening Digital Bilateral; Future    2. Well female exam with routine gynecological exam    - Pap imaged thin layer screen with HPV - recommended age 30 - 65 years (select HPV order below)  - *MA Screening Digital Bilateral; Future    COUNSELING:   Reviewed preventive health counseling, as reflected in patient instructions       Regular exercise       Healthy diet/nutrition     reports that she quit smoking about 33 years ago. Her smoking use included Cigarettes. She quit after 6.00 years of use. She has never used smokeless tobacco.    Estimated body mass index is 19.84 kg/(m^2) as calculated from the following:    Height as of this encounter: 5' 3\" (1.6 m).    Weight as of this encounter: 112 lb (50.8 kg).     Counseling Resources:  ATP IV Guidelines  Pooled Cohorts Equation Calculator  Breast Cancer Risk Calculator  FRAX Risk Assessment  ICSI Preventive Guidelines  Dietary Guidelines for Americans, 2010  [employer]'s MyPlate  ASA Prophylaxis  Lung CA Screening    Hunter Cruz MD  [facility]  "

## 2017-11-16 LAB
COPATH REPORT: NORMAL
PAP: NORMAL

## 2017-11-20 LAB
FINAL DIAGNOSIS: NORMAL
HPV HR 12 DNA CVX QL NAA+PROBE: NEGATIVE
HPV16 DNA SPEC QL NAA+PROBE: NEGATIVE
HPV18 DNA SPEC QL NAA+PROBE: NEGATIVE
SPECIMEN DESCRIPTION: NORMAL

## 2017-11-29 ENCOUNTER — OFFICE VISIT (OUTPATIENT)
Dept: FAMILY MEDICINE | Facility: CLINIC | Age: 57
End: 2017-11-29
Payer: COMMERCIAL

## 2017-11-29 VITALS
SYSTOLIC BLOOD PRESSURE: 130 MMHG | RESPIRATION RATE: 14 BRPM | HEIGHT: 63 IN | DIASTOLIC BLOOD PRESSURE: 80 MMHG | WEIGHT: 113 LBS | TEMPERATURE: 98.3 F | HEART RATE: 70 BPM | BODY MASS INDEX: 20.02 KG/M2

## 2017-11-29 DIAGNOSIS — Z01.818 PREOP GENERAL PHYSICAL EXAM: Primary | ICD-10-CM

## 2017-11-29 DIAGNOSIS — G56.02 CARPAL TUNNEL SYNDROME OF LEFT WRIST: ICD-10-CM

## 2017-11-29 PROCEDURE — 99214 OFFICE O/P EST MOD 30 MIN: CPT | Performed by: FAMILY MEDICINE

## 2017-11-29 ASSESSMENT — ANXIETY QUESTIONNAIRES
5. BEING SO RESTLESS THAT IT IS HARD TO SIT STILL: NOT AT ALL
3. WORRYING TOO MUCH ABOUT DIFFERENT THINGS: NOT AT ALL
2. NOT BEING ABLE TO STOP OR CONTROL WORRYING: NOT AT ALL
1. FEELING NERVOUS, ANXIOUS, OR ON EDGE: NOT AT ALL
6. BECOMING EASILY ANNOYED OR IRRITABLE: NOT AT ALL
7. FEELING AFRAID AS IF SOMETHING AWFUL MIGHT HAPPEN: NOT AT ALL
GAD7 TOTAL SCORE: 0

## 2017-11-29 ASSESSMENT — PATIENT HEALTH QUESTIONNAIRE - PHQ9
5. POOR APPETITE OR OVEREATING: NOT AT ALL
SUM OF ALL RESPONSES TO PHQ QUESTIONS 1-9: 0

## 2017-11-29 NOTE — NURSING NOTE
"Chief Complaint   Patient presents with     Pre-Op Exam     Medication Reconciliation     Stopped Celexa several months ago.       Initial /80  Pulse 70  Temp 98.3  F (36.8  C) (Tympanic)  Resp 14  Ht 5' 3\" (1.6 m)  Wt 113 lb (51.3 kg)  LMP 03/17/2008  BMI 20.02 kg/m2 Estimated body mass index is 20.02 kg/(m^2) as calculated from the following:    Height as of this encounter: 5' 3\" (1.6 m).    Weight as of this encounter: 113 lb (51.3 kg).  Medication Reconciliation: complete    Health Maintenance that is potentially due pending provider review:  PHQ9 and GAD7        Is there anyone who you would like to be able to receive your results? If yes have patient fill out YOBANY    "

## 2017-11-29 NOTE — PATIENT INSTRUCTIONS
Before Your Surgery      Call your surgeon if there is any change in your health. This includes signs of a cold or flu (such as a sore throat, runny nose, cough, rash or fever).    Do not smoke, drink alcohol or take over the counter medicine (unless your surgeon or primary care doctor tells you to) for the 24 hours before and after surgery.    If you take prescribed drugs: Follow your doctor s orders about which medicines to take and which to stop until after surgery.    Eating and drinking prior to surgery: follow the instructions from your surgeon    Take a shower or bath the night before surgery. Use the soap your surgeon gave you to gently clean your skin. If you do not have soap from your surgeon, use your regular soap. Do not shave or scrub the surgery site.  Wear clean pajamas and have clean sheets on your bed.     RECOMMENDATIONS:                                                      No aspirin for 7-10 days before surgery.   No naproxen or ibuprofen  three days before surgery.  Tylenol (acetaminophen) is oK.      APPROVAL GIVEN to proceed with proposed procedure, without further diagnostic evaluation

## 2017-11-29 NOTE — LETTER
My Depression Action Plan  Name: Lauren Tam   Date of Birth 1960  Date: 11/29/2017    My doctor: Fausto Gregg   My clinic: [address] [address] [phone]          GREEN    ZONE   Good Control    What it looks like:     Things are going generally well. You have normal up s and down s. You may even feel depressed from time to time, but bad moods usually last less than a day.   What you need to do:  1. Continue to care for yourself (see self care plan)  2. Check your depression survival kit and update it as needed  3. Follow your physician s recommendations including any medication.  4. Do not stop taking medication unless you consult with your physician first.           YELLOW         ZONE Getting Worse    What it looks like:     Depression is starting to interfere with your life.     It may be hard to get out of bed; you may be starting to isolate yourself from others.    Symptoms of depression are starting to last most all day and this has happened for several days.     You may have suicidal thoughts but they are not constant.   What you need to do:     1. Call your care team, your response to treatment will improve if you keep your care team informed of your progress. Yellow periods are signs an adjustment may need to be made.     2. Continue your self-care, even if you have to fake it!    3. Talk to someone in your support network    4. Open up your depression survival kit           RED    ZONE Medical Alert - Get Help    What it looks like:     Depression is seriously interfering with your life.     You may experience these or other symptoms: You can t get out of bed most days, can t work or engage in other necessary activities, you have trouble taking care of basic hygiene, or basic responsibilities, thoughts of suicide or death that will not go away, self-injurious behavior.     What you need to do:  1. Call your care team  and request a same-day appointment. If they are not available (weekends or after hours) call your local crisis line, emergency room or 911.      Electronically signed by: Cary Castellon, November 29, 2017    Depression Self Care Plan / Survival Kit    Self-Care for Depression  Here s the deal. Your body and mind are really not as separate as most people think.  What you do and think affects how you feel and how you feel influences what you do and think. This means if you do things that people who feel good do, it will help you feel better.  Sometimes this is all it takes.  There is also a place for medication and therapy depending on how severe your depression is, so be sure to consult with your medical provider and/ or Behavioral Health Consultant if your symptoms are worsening or not improving.     In order to better manage my stress, I will:    Exercise  Get some form of exercise, every day. This will help reduce pain and release endorphins, the  feel good  chemicals in your brain. This is almost as good as taking antidepressants!  This is not the same as joining a gym and then never going! (they count on that by the way ) It can be as simple as just going for a walk or doing some gardening, anything that will get you moving.      Hygiene   Maintain good hygiene (Get out of bed in the morning, Make your bed, Brush your teeth, Take a shower, and Get dressed like you were going to work, even if you are unemployed).  If your clothes don't fit try to get ones that do.    Diet  I will strive to eat foods that are good for me, drink plenty of water, and avoid excessive sugar, caffeine, alcohol, and other mood-altering substances.  Some foods that are helpful in depression are: complex carbohydrates, B vitamins, flaxseed, fish or fish oil, fresh fruits and vegetables.    Psychotherapy  I agree to participate in Individual Therapy (if recommended).    Medication  If prescribed medications, I agree to take them.  Missing  doses can result in serious side effects.  I understand that drinking alcohol, or other illicit drug use, may cause potential side effects.  I will not stop my medication abruptly without first discussing it with my provider.    Staying Connected With Others  I will stay in touch with my friends, family members, and my primary care provider/team.    Use your imagination  Be creative.  We all have a creative side; it doesn t matter if it s oil painting, sand castles, or mud pies! This will also kick up the endorphins.    Witness Beauty  (AKA stop and smell the roses) Take a look outside, even in mid-winter. Notice colors, textures. Watch the squirrels and birds.     Service to others  Be of service to others.  There is always someone else in need.  By helping others we can  get out of ourselves  and remember the really important things.  This also provides opportunities for practicing all the other parts of the program.    Humor  Laugh and be silly!  Adjust your TV habits for less news and crime-drama and more comedy.    Control your stress  Try breathing deep, massage therapy, biofeedback, and meditation. Find time to relax each day.     My support system    Clinic Contact:  Phone number:    Contact 1:  Phone number:    Contact 2:  Phone number:    Scientologist/:  Phone number:    Therapist:  Phone number:    Local crisis center:    Phone number:    Other community support:  Phone number:

## 2017-11-29 NOTE — MR AVS SNAPSHOT
After Visit Summary   11/29/2017    Lauren Tam    MRN: [id number]           Patient Information     Date Of Birth          1960        Visit Information        Provider Department      11/29/2017 10:40 AM Fausto Gregg MD [facility]        Today's Diagnoses     Preop general physical exam    -  1    Carpal tunnel syndrome of left wrist          Care Instructions      Before Your Surgery      Call your surgeon if there is any change in your health. This includes signs of a cold or flu (such as a sore throat, runny nose, cough, rash or fever).    Do not smoke, drink alcohol or take over the counter medicine (unless your surgeon or primary care doctor tells you to) for the 24 hours before and after surgery.    If you take prescribed drugs: Follow your doctor s orders about which medicines to take and which to stop until after surgery.    Eating and drinking prior to surgery: follow the instructions from your surgeon    Take a shower or bath the night before surgery. Use the soap your surgeon gave you to gently clean your skin. If you do not have soap from your surgeon, use your regular soap. Do not shave or scrub the surgery site.  Wear clean pajamas and have clean sheets on your bed.     RECOMMENDATIONS:                                                      No aspirin for 7-10 days before surgery.   No naproxen or ibuprofen  three days before surgery.  Tylenol (acetaminophen) is oK.      APPROVAL GIVEN to proceed with proposed procedure, without further diagnostic evaluation          Follow-ups after your visit        Your next 10 appointments already scheduled     Dec 05, 2017   Procedure with Eric Berry MD   [facility] (--)    [address] [place] [id number] [phone]           The medical center is located at [address] [facility]. (between [address] in [place], [place] miles north of [place]).            Dec 19, 2017  " 2:15 PM CST   MA SCREENING DIGITAL BILATERAL with [facility]   [facility] ([facility])    [address]  [facility] [address] [phone]           Do not use any powder, lotion or deodorant under your arms or on your breast. If you do, we will ask you to remove it before your exam.  Wear comfortable, two-piece clothing.  If you have any allergies, tell your care team.  Bring any previous mammograms from other facilities or have them mailed to the breast center. Three-dimensional (3D) mammograms are available at [facility] locations in [facility], [place], [place], [facility], [place], [place], and [place]. [facility] locations include [place] and [facility] in [place]. Benefits of 3D mammograms include: - Improved rate of cancer detection - Decreases your chance of having to go back for more tests, which means fewer: - \"False-positive\" results (This means that there is an abnormal area but it isn't cancer.) - Invasive testing procedures, such as a biopsy or surgery - Can provide clearer images of the breast if you have dense breast tissue. 3D mammography is an optional exam that anyone can have with a 2D mammogram. It doesn't replace or take the place of a 2D mammogram. 2D mammograms remain an effective screening test for all women.  Not all insurance companies cover the cost of a 3D mammogram. Check with your insurance.              Who to contact     If you have questions or need follow up information about today's clinic visit or your schedule please contact [facility] directly at [phone].  Normal or non-critical lab and imaging results will be communicated to you by MyChart, letter or phone within 4 business days after the clinic has received the results. If you do not hear from us within 7 days, please contact the clinic through MyChart or phone. If you have a critical or abnormal lab result, we will notify you by " "phone as soon as possible.  Submit refill requests through [employer] or call your pharmacy and they will forward the refill request to us. Please allow 3 business days for your refill to be completed.          Additional Information About Your Visit        [employer] Information     [employer] gives you secure access to your electronic health record. If you see a primary care provider, you can also send messages to your care team and make appointments. If you have questions, please call your primary care clinic.  If you do not have a primary care provider, please call [phone] and they will assist you.        Care EveryWhere ID     This is your Care EveryWhere ID. This could be used by other organizations to access your [place] medical records  [id number]        Your Vitals Were     Pulse Temperature Respirations Height Last Period BMI (Body Mass Index)    70 98.3  F (36.8  C) (Tympanic) 14 5' 3\" (1.6 m) 03/17/2008 20.02 kg/m2       Blood Pressure from Last 3 Encounters:   11/29/17 130/80   11/14/17 132/74   03/02/17 116/72    Weight from Last 3 Encounters:   11/29/17 113 lb (51.3 kg)   11/14/17 112 lb (50.8 kg)   03/02/17 116 lb 9.6 oz (52.9 kg)              We Performed the Following     DEPRESSION ACTION PLAN (DAP)          Today's Medication Changes          These changes are accurate as of: 11/29/17 11:49 AM.  If you have any questions, ask your nurse or doctor.               Stop taking these medicines if you haven't already. Please contact your care team if you have questions.     citalopram 20 MG tablet   Commonly known as:  celeXA   Stopped by:  Fausto Gregg MD                    Primary Care Provider Office Phone # Fax #    Fausto Gregg [phone] [address] [place] [address]        Equal Access to Services     MOHAMUD LOAIZA : Alejandro Verde, waaxda luqadaha, qaybta kaalmada flo, jaqui hoang. So wadelfina " [phone].    ATENCIÓN: Si marina gomez, tiene a kauffman disposición servicios gratuitos de asistencia lingüística. James huang [phone].    We comply with applicable federal civil rights laws and [place] laws. We do not discriminate on the basis of race, color, national origin, age, disability, sex, sexual orientation, or gender identity.            Thank you!     Thank you for choosing [facility]  for your care. Our goal is always to provide you with excellent care. Hearing back from our patients is one way we can continue to improve our services. Please take a few minutes to complete the written survey that you may receive in the mail after your visit with us. Thank you!             Your Updated Medication List - Protect others around you: Learn how to safely use, store and throw away your medicines at www.disposemymeds.org.          This list is accurate as of: 11/29/17 11:49 AM.  Always use your most recent med list.                   Brand Name Dispense Instructions for use Diagnosis    EXCEDRIN PO      Take by mouth as needed        fluticasone 50 MCG/ACT spray    FLONASE    1 g    Spray 1-2 sprays into both nostrils daily    Acute sinusitis with symptoms > 10 days       GLUCOSAMINE-CHONDROITIN PO      Take 2 tablets by mouth daily        MULTIVITAMIN TABS   OR      1 TABLET DAILY        NAPROSYN 500 MG tablet   Generic drug:  naproxen      Take 1 tablet (500 mg) by mouth 2 times daily as needed for moderate pain    Cervicalgia

## 2017-11-29 NOTE — PROGRESS NOTES
[facility]  [address] [place] [address]  [phone]  Dept: [phone]    PRE-OP EVALUATION:  Today's date: 2017    Lauren Tam (: 1960) presents for pre-operative evaluation assessment as requested by Dr. Berry.  She requires evaluation and anesthesia risk assessment prior to undergoing surgery/procedure for treatment of Left Thumb .  Proposed procedure: Interposition Tendon Hand    Date of Surgery/ Procedure: 2017  Time of Surgery/ Procedure:   Hospital/Surgical Facility: [facility]  Fax number for surgical facility:   Primary Physician: Fausto Gregg  Type of Anesthesia Anticipated: Combined General with Block    Patient has a Health Care Directive or Living Will:      1. NO - Do you have a history of heart attack, stroke, stent, bypass or surgery on an artery in the head, neck, heart or legs?  2. NO - Do you ever have any pain or discomfort in your chest?  3. NO - Do you have a history of  Heart Failure?  4. NO - Are you troubled by shortness of breath when: walking on the level, up a slight hill or at night?  5. NO - Do you currently have a cold, bronchitis or other respiratory infection?  6. NO - Do you have a cough, shortness of breath or wheezing?  7. NO - Do you sometimes get pains in the calves of your legs when you walk?  8. NO - Do you or anyone in your family have previous history of blood clots?  9. NO - Do you or does anyone in your family have a serious bleeding problem such as prolonged bleeding following surgeries or cuts?  10. yes Have you ever had problems with anemia or been told to take iron pills?  Problem in the past.    11. NO - Have you had any abnormal blood loss such as black, tarry or bloody stools, or abnormal vaginal bleeding?  12. NO - Have you ever had a blood transfusion?  13. NO - Have you or any of your relatives ever had problems with anesthesia?  14. NO - Do you have sleep apnea, excessive snoring or daytime  drowsiness?  15. NO - Do you have any prosthetic heart valves?  16. NO - Do you have prosthetic joints?  17. NO - Is there any chance that you may be pregnant?    HPI:                                                    She has had pain and numbness in both wrists and hands.  She has bilateral carpal tunnel.  She has consulted orthopedics and is scheduled for surgery for carpal tunnel on left wrist.     MEDICAL HISTORY:                                                    Patient Active Problem List    Diagnosis Date Noted     ASCUS with positive high risk human papillomavirus of vagina 01/04/2017     Priority: Medium     S/P complete hysterectomy 01/04/2017     Priority: Medium     Severe episode of recurrent major depressive disorder, without psychotic features (H) 09/12/2016     Priority: Medium     S/P LEEP of cervix-CIN2 06/23/2015     Priority: Medium     1/9/2015:Pap--NIL, +HR HPV type 18 with an additional HR HPV type identified as well. Plan [place]-  4/22/15: Pt noncompliant. Watson not done. Reminder placed for 6 month pap.   6/5/15:[facility] Bx - Negative, ECC - suspicious for HSIL. Pap--ASCUS, +HR HPV type 18 & other HR HPV type(s) identified. Plan colp/possible LEEP with GYN  6/23/15: LEEP - ELAINA 2, indeterminate endocervical extension. Plan cytology and repeat ECC in 4-6 months.   10/12/15: Hysterectomy - negative. Repeat pap in 1 year.   12/2/16: ASCUS/+ HR HPV (not 16 or 18): Plan: plan colp within 3 months.   1/4/17: Watson - visually normal; no biopsies taken. Plan cotest in 1 year.   11/14/17 Pap: NIL/neg HPV. Plan Pap+HPV in 1 year.       CARDIOVASCULAR SCREENING; LDL GOAL LESS THAN 160 10/31/2010     Priority: Medium     Advanced directives, counseling/discussion 09/25/2015     Priority: Low     Information given to patient        Past Medical History:   Diagnosis Date     ASCUS with positive high risk HPV cervical 12/2/16     Cervical high risk HPV (human papillomavirus) test positive 1/16/2015     1/9/2015:Pap--NIL, +HR HPV type 18 with an additional HR HPV type identified as well. Plan [place]-      Excessive or frequent menstruation      H/O colposcopy with cervical biopsy 6/5/2015    Bx - benign, ECC - suspicious for HSIL     Past Surgical History:   Procedure Laterality Date     COLONOSCOPY  3/2/2011    COLONOSCOPY performed by CESAR GONZALEZ at [facility]     CYSTOSCOPY N/A 10/12/2015    Procedure: CYSTOSCOPY;  Surgeon: Hunter Cruz MD;  Location: [facility]     LAPAROSCOPIC HYSTERECTOMY TOTAL, BILATERAL SALPINGO-OOPHORECTOMY, COMBINED N/A 10/12/2015    Procedure: COMBINED LAPAROSCOPIC HYSTERECTOMY TOTAL, SALPINGO-OOPHORECTOMY;  Surgeon: Hunter Cruz MD;  Location: [facility]     LEEP TX, CERVICAL  6/23/15    ELAINA 1/2 with indeterminate margin     SURGICAL HISTORY OF -   1967    T&A     SURGICAL HISTORY OF -       PE Tubes x6 starting 1967     SURGICAL HISTORY OF -   1979    Appy     SURGICAL HISTORY OF -       D&C     SURGICAL HISTORY OF -   1984    Tubal Ligation     SURGICAL HISTORY OF -   1987    Arthroscopy (R) Knee     Current Outpatient Prescriptions   Medication Sig Dispense Refill     citalopram (CELEXA) 20 MG tablet Take 1 tablet (20 mg) by mouth daily 30 tablet 11     Aspirin-Acetaminophen-Caffeine (EXCEDRIN PO) Take by mouth as needed        GLUCOSAMINE-CHONDROITIN PO Take 2 tablets by mouth daily       naproxen (NAPROSYN) 500 MG tablet Take 1 tablet (500 mg) by mouth 2 times daily as needed for moderate pain       fluticasone (FLONASE) 50 MCG/ACT nasal spray Spray 1-2 sprays into both nostrils daily 1 g 0     MULTIVITAMIN TABS   OR 1 TABLET DAILY       OTC products: None, except as noted above    Allergies   Allergen Reactions     Nkda [No Known Drug Allergies]       Latex Allergy: NO    Social History   Substance Use Topics     Smoking status: Former Smoker     Years: 6.00     Types: Cigarettes     Quit date: 1/1/1984     Smokeless tobacco: Never Used      Comment: 23 years  "ago quit date     Alcohol use Yes      Comment: 3 drinks a weekly     History   Drug Use No     Family History :  ============  No family history of bleeding or anesthesia problems.     REVIEW OF SYSTEMS:                                                    ROS:  General: No change in weight, sleep or appetite.  Normal energy.  No fever or chills  Eyes: Negative for vision changes or eye problems  ENT: No problems with ears, nose or throat.  No difficulty swallowing.  Resp: No coughing, wheezing or shortness of breath  CV: No chest pains or palpitations  GI: No nausea, vomiting,  heartburn, abdominal pain, diarrhea, constipation or change in bowel habits  : No urinary frequency or dysuria, bladder or kidney problems  Musculoskeletal: POSITIVE  for:, pain neck and wrists and hands.   Neurologic: No headaches, numbness, tingling, weakness, problems with balance or coordination  Psychiatric: No problems with anxiety, depression or mental health.  She has been off citalopram for a couple months and has been doing well.   PHQ9 score today= 0, generalized anxiety disorder scale score today= 0  Heme/immune/allergy: POSITIVE  for:, anemia in the past  Endocrine: No history of thyroid disease, diabetes or other endocrine disorders  Skin: No rashes,worrisome lesions or skin problems     EXAM:                                                    OBJECTIVE:Blood pressure 130/80, pulse 70, temperature 98.3  F (36.8  C), temperature source Tympanic, resp. rate 14, height 5' 3\" (1.6 m), weight 113 lb (51.3 kg), last menstrual period 03/17/2008, not currently breastfeeding. BMI=Body mass index is 20.02 kg/(m^2).  GENERAL APPEARANCE ADULT: Alert, no acute distress  EYES: PERRL, EOM normal, conjunctiva and lids normal  HENT: Ears and TMs normal, oral mucosa and posterior oropharynx normal, edentulous  with denture plates  NECK: No adenopathy,masses or thyromegaly  RESP: lungs clear to auscultation   CV: normal rate, regular rhythm, no " murmur or gallop  ABDOMEN: soft, no organomegaly or tenderness.   She has a 4cm mass just left of midline above umbilicus.  Does not seem to reduce or change with straining.  May be lipoma.  Hernia less likely.  She reports unchanged for years and no symptoms.   MS: extremities normal, no peripheral edema     DIAGNOSTICS:                                                    EKG: Not indicated due to non-vascular surgery and low risk of event (age <65 and without cardiac risk factors)  No labs needed.     Recent Labs   Lab Test  01/17/17   1144  08/24/16   1415  07/26/16 2000   HGB  14.9   --   12.4   PLT  228   --   219   NA  138  139  146*   POTASSIUM  4.4  3.8  3.4   CR  0.84  0.76  0.84        IMPRESSION:                                                    Reason for surgery/procedure: carpal tunnel left wrist.     The proposed surgical procedure is considered INTERMEDIATE risk.    REVISED CARDIAC RISK INDEX  The patient has the following serious cardiovascular risks for perioperative complications such as (MI, PE, VFib and 3  AV Block):  No serious cardiac risks  INTERPRETATION: 0 risks: Class I (very low risk - 0.4% complication rate)    The patient has the following additional risks for perioperative complications:  No identified additional risks      ICD-10-CM    1. Preop general physical exam Z01.818    2. Carpal tunnel syndrome of left wrist G56.02        RECOMMENDATIONS:                                                      No aspirin for 7-10 days before surgery.   No naproxen or ibuprofen  three days before surgery.  Tylenol (acetaminophen) is oK.      APPROVAL GIVEN to proceed with proposed procedure, without further diagnostic evaluation       Signed Electronically by: Fausto Gregg MD    Copy of this evaluation report is provided to requesting physician.    [place] Preop Guidelines

## 2017-11-30 ASSESSMENT — ANXIETY QUESTIONNAIRES: GAD7 TOTAL SCORE: 0

## 2017-12-03 ENCOUNTER — ANESTHESIA EVENT (OUTPATIENT)
Dept: SURGERY | Facility: CLINIC | Age: 57
End: 2017-12-03
Payer: COMMERCIAL

## 2017-12-05 ENCOUNTER — SURGERY (OUTPATIENT)
Age: 57
End: 2017-12-05

## 2017-12-05 ENCOUNTER — ANESTHESIA (OUTPATIENT)
Dept: SURGERY | Facility: CLINIC | Age: 57
End: 2017-12-05
Payer: COMMERCIAL

## 2017-12-05 ENCOUNTER — HOSPITAL ENCOUNTER (OUTPATIENT)
Facility: CLINIC | Age: 57
Discharge: HOME | End: 2017-12-05
Attending: ORTHOPAEDIC SURGERY | Admitting: ORTHOPAEDIC SURGERY
Payer: COMMERCIAL

## 2017-12-05 VITALS
HEIGHT: 63 IN | OXYGEN SATURATION: 96 % | BODY MASS INDEX: 20.02 KG/M2 | DIASTOLIC BLOOD PRESSURE: 68 MMHG | TEMPERATURE: 98.4 F | RESPIRATION RATE: 20 BRPM | SYSTOLIC BLOOD PRESSURE: 128 MMHG | WEIGHT: 113 LBS

## 2017-12-05 DIAGNOSIS — M18.12 ARTHRITIS OF CARPOMETACARPAL (CMC) JOINT OF LEFT THUMB: Primary | ICD-10-CM

## 2017-12-05 PROCEDURE — 37000009 ZZH ANESTHESIA TECHNICAL FEE, EACH ADDTL 15 MIN: Performed by: ORTHOPAEDIC SURGERY

## 2017-12-05 PROCEDURE — 25000125 ZZHC RX 250: Performed by: NURSE ANESTHETIST, CERTIFIED REGISTERED

## 2017-12-05 PROCEDURE — 37000008 ZZH ANESTHESIA TECHNICAL FEE, 1ST 30 MIN: Performed by: ORTHOPAEDIC SURGERY

## 2017-12-05 PROCEDURE — 36000058 ZZH SURGERY LEVEL 3 EA 15 ADDTL MIN: Performed by: ORTHOPAEDIC SURGERY

## 2017-12-05 PROCEDURE — 25000132 ZZH RX MED GY IP 250 OP 250 PS 637: Performed by: PHYSICIAN ASSISTANT

## 2017-12-05 PROCEDURE — 88304 TISSUE EXAM BY PATHOLOGIST: CPT | Performed by: ORTHOPAEDIC SURGERY

## 2017-12-05 PROCEDURE — 25000566 ZZH SEVOFLURANE, EA 15 MIN: Performed by: ORTHOPAEDIC SURGERY

## 2017-12-05 PROCEDURE — C1713 ANCHOR/SCREW BN/BN,TIS/BN: HCPCS | Performed by: ORTHOPAEDIC SURGERY

## 2017-12-05 PROCEDURE — 88311 DECALCIFY TISSUE: CPT | Performed by: ORTHOPAEDIC SURGERY

## 2017-12-05 PROCEDURE — 71000027 ZZH RECOVERY PHASE 2 EACH 15 MINS: Performed by: ORTHOPAEDIC SURGERY

## 2017-12-05 PROCEDURE — 27210794 ZZH OR GENERAL SUPPLY STERILE: Performed by: ORTHOPAEDIC SURGERY

## 2017-12-05 PROCEDURE — 88311 DECALCIFY TISSUE: CPT | Mod: 26 | Performed by: ORTHOPAEDIC SURGERY

## 2017-12-05 PROCEDURE — 40000306 ZZH STATISTIC PRE PROC ASSESS II: Performed by: ORTHOPAEDIC SURGERY

## 2017-12-05 PROCEDURE — 25000128 H RX IP 250 OP 636: Performed by: NURSE ANESTHETIST, CERTIFIED REGISTERED

## 2017-12-05 PROCEDURE — 36000056 ZZH SURGERY LEVEL 3 1ST 30 MIN: Performed by: ORTHOPAEDIC SURGERY

## 2017-12-05 PROCEDURE — 71000012 ZZH RECOVERY PHASE 1 LEVEL 1 FIRST HR: Performed by: ORTHOPAEDIC SURGERY

## 2017-12-05 PROCEDURE — 25000128 H RX IP 250 OP 636: Performed by: PHYSICIAN ASSISTANT

## 2017-12-05 PROCEDURE — 88304 TISSUE EXAM BY PATHOLOGIST: CPT | Mod: 26 | Performed by: ORTHOPAEDIC SURGERY

## 2017-12-05 DEVICE — IMPLANTABLE DEVICE: Type: IMPLANTABLE DEVICE | Site: WRIST | Status: FUNCTIONAL

## 2017-12-05 DEVICE — IMP SCR ARTHREX BIO-TENODESIS BIOCOMP 4X10MM AR-1540BC: Type: IMPLANTABLE DEVICE | Site: WRIST | Status: FUNCTIONAL

## 2017-12-05 RX ORDER — ONDANSETRON 2 MG/ML
INJECTION INTRAMUSCULAR; INTRAVENOUS PRN
Status: DISCONTINUED | OUTPATIENT
Start: 2017-12-05 | End: 2017-12-05

## 2017-12-05 RX ORDER — LIDOCAINE 40 MG/G
CREAM TOPICAL
Status: DISCONTINUED | OUTPATIENT
Start: 2017-12-05 | End: 2017-12-05 | Stop reason: HOSPADM

## 2017-12-05 RX ORDER — SODIUM CHLORIDE, SODIUM LACTATE, POTASSIUM CHLORIDE, CALCIUM CHLORIDE 600; 310; 30; 20 MG/100ML; MG/100ML; MG/100ML; MG/100ML
INJECTION, SOLUTION INTRAVENOUS CONTINUOUS
Status: DISCONTINUED | OUTPATIENT
Start: 2017-12-05 | End: 2017-12-05 | Stop reason: HOSPADM

## 2017-12-05 RX ORDER — PROPOFOL 10 MG/ML
INJECTION, EMULSION INTRAVENOUS PRN
Status: DISCONTINUED | OUTPATIENT
Start: 2017-12-05 | End: 2017-12-05

## 2017-12-05 RX ORDER — ONDANSETRON 4 MG/1
4-8 TABLET, ORALLY DISINTEGRATING ORAL EVERY 8 HOURS PRN
Qty: 4 TABLET | Refills: 0 | Status: SHIPPED | OUTPATIENT
Start: 2017-12-05 | End: 2018-03-14

## 2017-12-05 RX ORDER — HYDROXYZINE HYDROCHLORIDE 25 MG/1
25 TABLET, FILM COATED ORAL EVERY 6 HOURS PRN
Qty: 30 TABLET | Refills: 0
Start: 2017-12-05 | End: 2019-11-14

## 2017-12-05 RX ORDER — OXYCODONE HYDROCHLORIDE 5 MG/1
5 TABLET ORAL
Status: DISCONTINUED | OUTPATIENT
Start: 2017-12-05 | End: 2017-12-05 | Stop reason: HOSPADM

## 2017-12-05 RX ORDER — ACETAMINOPHEN 325 MG/1
975 TABLET ORAL ONCE
Status: COMPLETED | OUTPATIENT
Start: 2017-12-05 | End: 2017-12-05

## 2017-12-05 RX ORDER — ACETAMINOPHEN 325 MG/1
975 TABLET ORAL EVERY 8 HOURS
Qty: 100 TABLET | Refills: 0 | COMMUNITY
Start: 2017-12-05 | End: 2021-11-21

## 2017-12-05 RX ORDER — CEFAZOLIN SODIUM 2 G/100ML
2 INJECTION, SOLUTION INTRAVENOUS
Status: COMPLETED | OUTPATIENT
Start: 2017-12-05 | End: 2017-12-05

## 2017-12-05 RX ORDER — GABAPENTIN 300 MG/1
300 CAPSULE ORAL
Status: COMPLETED | OUTPATIENT
Start: 2017-12-05 | End: 2017-12-05

## 2017-12-05 RX ORDER — LIDOCAINE HCL/EPINEPHRINE/PF 2%-1:200K
VIAL (ML) INJECTION PRN
Status: DISCONTINUED | OUTPATIENT
Start: 2017-12-05 | End: 2017-12-05

## 2017-12-05 RX ORDER — LIDOCAINE HYDROCHLORIDE 10 MG/ML
INJECTION, SOLUTION INFILTRATION; PERINEURAL PRN
Status: DISCONTINUED | OUTPATIENT
Start: 2017-12-05 | End: 2017-12-05

## 2017-12-05 RX ORDER — ROPIVACAINE HYDROCHLORIDE 5 MG/ML
INJECTION, SOLUTION EPIDURAL; INFILTRATION; PERINEURAL PRN
Status: DISCONTINUED | OUTPATIENT
Start: 2017-12-05 | End: 2017-12-05

## 2017-12-05 RX ORDER — ACETAMINOPHEN 325 MG/1
650 TABLET ORAL
Status: DISCONTINUED | OUTPATIENT
Start: 2017-12-05 | End: 2017-12-05 | Stop reason: HOSPADM

## 2017-12-05 RX ORDER — FENTANYL CITRATE 50 UG/ML
INJECTION, SOLUTION INTRAMUSCULAR; INTRAVENOUS PRN
Status: DISCONTINUED | OUTPATIENT
Start: 2017-12-05 | End: 2017-12-05

## 2017-12-05 RX ORDER — DEXAMETHASONE SODIUM PHOSPHATE 4 MG/ML
INJECTION, SOLUTION INTRA-ARTICULAR; INTRALESIONAL; INTRAMUSCULAR; INTRAVENOUS; SOFT TISSUE PRN
Status: DISCONTINUED | OUTPATIENT
Start: 2017-12-05 | End: 2017-12-05

## 2017-12-05 RX ORDER — OXYCODONE AND ACETAMINOPHEN 5; 325 MG/1; MG/1
1-2 TABLET ORAL EVERY 4 HOURS PRN
Qty: 40 TABLET | Refills: 0
Start: 2017-12-05 | End: 2018-03-14

## 2017-12-05 RX ADMIN — FENTANYL CITRATE 100 MCG: 50 INJECTION, SOLUTION INTRAMUSCULAR; INTRAVENOUS at 15:08

## 2017-12-05 RX ADMIN — FENTANYL CITRATE 100 MCG: 50 INJECTION, SOLUTION INTRAMUSCULAR; INTRAVENOUS at 16:05

## 2017-12-05 RX ADMIN — FENTANYL CITRATE 50 MCG: 50 INJECTION, SOLUTION INTRAMUSCULAR; INTRAVENOUS at 16:09

## 2017-12-05 RX ADMIN — MIDAZOLAM HYDROCHLORIDE 1 MG: 1 INJECTION, SOLUTION INTRAMUSCULAR; INTRAVENOUS at 16:09

## 2017-12-05 RX ADMIN — PROPOFOL 50 MG: 10 INJECTION, EMULSION INTRAVENOUS at 16:09

## 2017-12-05 RX ADMIN — ROPIVACAINE HYDROCHLORIDE 30 ML: 5 INJECTION, SOLUTION EPIDURAL; INFILTRATION; PERINEURAL at 15:16

## 2017-12-05 RX ADMIN — LIDOCAINE HYDROCHLORIDE 2 ML: 10 INJECTION, SOLUTION INFILTRATION; PERINEURAL at 15:13

## 2017-12-05 RX ADMIN — MIDAZOLAM HYDROCHLORIDE 2 MG: 1 INJECTION, SOLUTION INTRAMUSCULAR; INTRAVENOUS at 15:08

## 2017-12-05 RX ADMIN — DEXAMETHASONE SODIUM PHOSPHATE 8 MG: 4 INJECTION, SOLUTION INTRA-ARTICULAR; INTRALESIONAL; INTRAMUSCULAR; INTRAVENOUS; SOFT TISSUE at 16:09

## 2017-12-05 RX ADMIN — ACETAMINOPHEN 975 MG: 325 TABLET, FILM COATED ORAL at 13:34

## 2017-12-05 RX ADMIN — ONDANSETRON 4 MG: 2 INJECTION INTRAMUSCULAR; INTRAVENOUS at 16:09

## 2017-12-05 RX ADMIN — LIDOCAINE HYDROCHLORIDE,EPINEPHRINE BITARTRATE 5 ML: 20; .005 INJECTION, SOLUTION EPIDURAL; INFILTRATION; INTRACAUDAL; PERINEURAL at 15:14

## 2017-12-05 RX ADMIN — MIDAZOLAM HYDROCHLORIDE 1 MG: 1 INJECTION, SOLUTION INTRAMUSCULAR; INTRAVENOUS at 16:05

## 2017-12-05 RX ADMIN — SODIUM CHLORIDE, POTASSIUM CHLORIDE, SODIUM LACTATE AND CALCIUM CHLORIDE: 600; 310; 30; 20 INJECTION, SOLUTION INTRAVENOUS at 17:28

## 2017-12-05 RX ADMIN — SODIUM CHLORIDE, POTASSIUM CHLORIDE, SODIUM LACTATE AND CALCIUM CHLORIDE: 600; 310; 30; 20 INJECTION, SOLUTION INTRAVENOUS at 13:26

## 2017-12-05 RX ADMIN — GABAPENTIN 300 MG: 300 CAPSULE ORAL at 13:34

## 2017-12-05 RX ADMIN — CEFAZOLIN SODIUM 2 G: 2 INJECTION, SOLUTION INTRAVENOUS at 16:05

## 2017-12-05 RX ADMIN — LIDOCAINE HYDROCHLORIDE 1 ML: 10 INJECTION, SOLUTION EPIDURAL; INFILTRATION; INTRACAUDAL; PERINEURAL at 13:26

## 2017-12-05 ASSESSMENT — LIFESTYLE VARIABLES: TOBACCO_USE: 1

## 2017-12-05 NOTE — OP NOTE
Preoperative diagnosis:  1. Left thumb CMC joint osteoarthritis  2. Left wrist scaphotrapezial trapezoid joint osteoarthritis  3. Left thumb MCP joint hyperextensibility    Postoperative diagnoses:  1. Left thumb CMC joint osteophytes  2. Left wrist scaphotrapezial trapezoid joint osteophyte is  3. Left thumb MCP joint hyperextensibility    Procedure:  1. Left thumb trapezium excision and ligament reconstruction tendon interposition arthroplasty with flexor carpi radialis tendon transfer  2. Left thumb kamala-trapezoidectomy with flexor carpi radialis tendon interposition arthroplasty  3. Extensor pollicis brevis to abductor pollicis longus tendon transfer  4. Left thumb MCP joint volar plate capsulodesis    Anesthesia: Regional and general    Surgeon: Eric Berry M.D.    Assistant: Sue Coronado PA-C    Procedure: The patient was taken to the main operating suites. Once there she was transferred over to the operating table in the supine position. She was given IV conscious sedation per anesthesia. The left upper extremity was prepped and draped in the usual sterile fashion. The arm was exsanguinated with an Esmarch bandage and the tourniquet was prepped to 250 mmHg. A 15 blade knife was utilized to make a Young type incision to the base of the thumb. The cutaneous nerves were carefully protected throughout the course of the case. The trapezium was subperiosteally exposed and excised in a piecemeal fashion. The proximal 40% or so of the trapezoid was excised with an osteotome. The flexor carpi radialis tendon was harvested through 2 transverse incisions 5 cm and 10 cm proximal to the wrist flexion crease respectively. The ulnar two thirds of the tendon was harvested. This was then split down to its insertion point on the base of the second metacarpal and brought into the distal wound. About 25% of this portion of the tendon was then split longitudinally. A 2.2 mm Arthrex small bone FT anchor was placed in the  capitate. This 25% of the flexor carpi radialis tendon was then weaved through the sutures of the anchor in an anchovy type fashion to create an interposition arthroplasty for the scaphotrapezoid joint. A 4 mm drill hole was then put in place and the base of the thumb metacarpal exiting at the insertion point of the volar oblique ligament. The remaining portion of the tendon transfer was passed through this hole and a 4 x 10 mm Bio-Tenodesis screw from Arthrex was used to achieve a tenodesis effect. The tendon transfer was then sutured to the surrounding periosteum with 3-0 FiberWire in figure-of-eight fashion. It was brought through a dorsal window in the capsule and sutured back onto itself at its insertion point on the second metacarpal with 3-0 FiberWire in a figure-of-eight fashion. In this fashion a sling was created suspending the thumb. The remaining portion of the graft was weaved once again an anchovy type fashion to a 2-0 FiberWire suture placed in the deep capsule. Once this was tied the capsule was closed with 3-0 4 FiberWire in a figure-of-eight fashion for a watertight closure. Prior to performing the tenodesis the MCP joint of the thumb was exposed on the radial side. Thenar muscles were retracted volarly and the longitudinal incision was made on the volar plate  it from the radial collateral ligament. An Arthrex 2.2 mm small bone FT was put in place at the metacarpal neck. The volar plate was tightened and sutured to this to eliminate a hyperextensibility of the thumb MCP joint. Once this was accomplished and the tenodesis was completed the thenar muscles were closed with 4-0 FiberWire in a figure-of-eight fashion. The extensor pollicis brevis was incised sutured to the abductor pollicis longus in mattress-type fashion with 4-0 FiberWire. Skin was closed with 4-0 nylon in a mattress fashion. A dressing consisting of Adaptic gauze 4 x 4 fluffs and sterile Webril and a thumb spica plaster  splint covered by Ace bandages was then applied. The tourniquet was let down for a total tourniquet time of 86 minutes. She is awakened per anesthesia transferred to the transfer cart and taken to the recovery room in stable condition and breathing spontaneously.

## 2017-12-05 NOTE — IP AVS SNAPSHOT
[facility] Pre[facility]/Phase II    [address] [facility] [address]    Phone:  [phone]    Fax:  [phone]                                       After Visit Summary   12/5/2017    Lauren Tam    MRN: [id number]           After Visit Summary Signature Page     I have received my discharge instructions, and my questions have been answered. I have discussed any challenges I see with this plan with the nurse or doctor.    ..........................................................................................................................................  Patient/Patient Representative Signature      ..........................................................................................................................................  Patient Representative Print Name and Relationship to Patient    ..................................................               ................................................  Date                                            Time    ..........................................................................................................................................  Reviewed by Signature/Title    ...................................................              ..............................................  Date                                                            Time

## 2017-12-05 NOTE — ANESTHESIA PROCEDURE NOTES
Peripheral nerve/Neuraxial procedure note : Supraclavicular  Pre-Procedure  Performed by  NOEL HOLBROOK   Location: pre-op    Procedure Times:12/5/2017 3:09 PM and 12/5/2017 3:16 PM  Pre-Anesthestic Checklist: patient identified, IV checked, site marked, risks and benefits discussed, informed consent, monitors and equipment checked, pre-op evaluation, at physician/surgeon's request and post-op pain management    Timeout  Correct Patient: Yes   Correct Procedure: Yes   Correct Site: Yes   Correct Laterality: Yes   Correct Position: Yes   Site Marked: Yes   .   Procedure Documentation    Diagnosis:LEFT CMC ARTHRITIS.    Procedure:    Supraclavicular.  Local skin infiltrated with 1 mL of 1% lidocaine.     Ultrasound used to identify targeted nerve, plexus, or vascular marker and placed a needle adjacent to it., Ultrasound was used to visualize the spread of the anesthetic in close proximity to the above stated nerve. A permanent image is entered into the patient's record.  Patient Prep;mask, sterile gloves, chlorhexidine gluconate and isopropyl alcohol, patient draped.  Nerve Stim: Initial Level 1 mA. Lowest motor response mA..  Needle: insulated, short bevel Needle Gauge: 21.    Needle Length (Inches) 2  .       Assessment/Narrative  Paresthesias: No.  Injection made incrementally with aspirations every 5 mL..  The placement was negative for: blood aspirated, painful injection and site bleeding.  Bolus given via needle. No blood aspirated via catheter.   Secured via.   Complications: none. Test dose of 5 mL lidocaine 2% w/ 1:200,000 epinephrine at 15:14.  Test dose negative for signs of intravascular, subdural or intrathecal injection.

## 2017-12-05 NOTE — H&P (VIEW-ONLY)
[facility]  [address] [place] [id number]  [phone]  Dept: [phone]    PRE-OP EVALUATION:  Today's date: 2017    Lauren Tam (: 1960) presents for pre-operative evaluation assessment as requested by Dr. Berry.  She requires evaluation and anesthesia risk assessment prior to undergoing surgery/procedure for treatment of Left Thumb .  Proposed procedure: Interposition Tendon Hand    Date of Surgery/ Procedure: 2017  Time of Surgery/ Procedure:   [place] [facility] [place]:   Primary Physician: Fausto Gregg  Type of Anesthesia Anticipated: Combined General with Block    Patient has a Health Care Directive or Living Will:      1. NO - Do you have a history of heart attack, stroke, stent, bypass or surgery on an artery in the head, neck, heart or legs?  2. NO - Do you ever have any pain or discomfort in your chest?  3. NO - Do you have a history of  Heart Failure?  4. NO - Are you troubled by shortness of breath when: walking on the level, up a slight hill or at night?  5. NO - Do you currently have a cold, bronchitis or other respiratory infection?  6. NO - Do you have a cough, shortness of breath or wheezing?  7. NO - Do you sometimes get pains in the calves of your legs when you walk?  8. NO - Do you or anyone in your family have previous history of blood clots?  9. NO - Do you or does anyone in your family have a serious bleeding problem such as prolonged bleeding following surgeries or cuts?  10. yes Have you ever had problems with anemia or been told to take iron pills?  Problem in the past.    11. NO - Have you had any abnormal blood loss such as black, tarry or bloody stools, or abnormal vaginal bleeding?  12. NO - Have you ever had a blood transfusion?  13. NO - Have you or any of your relatives ever had problems with anesthesia?  14. NO - Do you have sleep apnea, excessive snoring or daytime  drowsiness?  15. NO - Do you have any prosthetic heart valves?  16. NO - Do you have prosthetic joints?  17. NO - Is there any chance that you may be pregnant?    HPI:                                                    She has had pain and numbness in both wrists and hands.  She has bilateral carpal tunnel.  She has consulted orthopedics and is scheduled for surgery for carpal tunnel on left wrist.     MEDICAL HISTORY:                                                    Patient Active Problem List    Diagnosis Date Noted     ASCUS with positive high risk human papillomavirus of vagina 01/04/2017     Priority: Medium     S/P complete hysterectomy 01/04/2017     Priority: Medium     Severe episode of recurrent major depressive disorder, without psychotic features (H) 09/12/2016     Priority: Medium     S/P LEEP of cervix-CIN2 06/23/2015     Priority: Medium     1/9/2015:Pap--NIL, +HR HPV type 18 with an additional HR HPV type identified as well. Plan [place]-  4/22/15: Pt noncompliant. [place] not done. Reminder placed for 6 month pap.   6/5/15:[place] Bx - Negative, ECC - suspicious for HSIL. Pap--ASCUS, +HR HPV type 18 & other HR HPV type(s) identified. Plan colp/possible LEEP with GYN  6/23/15: LEEP - ELAINA 2, indeterminate endocervical extension. Plan cytology and repeat ECC in 4-6 months.   10/12/15: Hysterectomy - negative. Repeat pap in 1 year.   12/2/16: ASCUS/+ HR HPV (not 16 or 18): Plan: plan colp within 3 months.   1/4/17: [place] - visually normal; no biopsies taken. Plan cotest in 1 year.   11/14/17 Pap: NIL/neg HPV. Plan Pap+HPV in 1 year.       CARDIOVASCULAR SCREENING; LDL GOAL LESS THAN 160 10/31/2010     Priority: Medium     Advanced directives, counseling/discussion 09/25/2015     Priority: Low     Information given to patient        Past Medical History:   Diagnosis Date     ASCUS with positive high risk HPV cervical 12/2/16     Cervical high risk HPV (human papillomavirus) test positive 1/16/2015     1/9/2015:Pap--NIL, +HR HPV type 18 with an additional HR HPV type identified as well. Plan [place]-      Excessive or frequent menstruation      H/O colposcopy with cervical biopsy 6/5/2015    Bx - benign, ECC - suspicious for HSIL     Past Surgical History:   Procedure Laterality Date     COLONOSCOPY  3/2/2011    COLONOSCOPY performed by CESAR GONZALEZ at [facility]     CYSTOSCOPY N/A 10/12/2015    Procedure: CYSTOSCOPY;  Surgeon: Hunter Cruz MD;  Location: [facility]     LAPAROSCOPIC HYSTERECTOMY TOTAL, BILATERAL SALPINGO-OOPHORECTOMY, COMBINED N/A 10/12/2015    Procedure: COMBINED LAPAROSCOPIC HYSTERECTOMY TOTAL, SALPINGO-OOPHORECTOMY;  Surgeon: Hunter Cruz MD;  Location: [facility]     LEEP TX, CERVICAL  6/23/15    ELAINA 1/2 with indeterminate margin     SURGICAL HISTORY OF -   1967    T&A     SURGICAL HISTORY OF -       PE Tubes x6 starting 1967     SURGICAL HISTORY OF -   1979    Appy     SURGICAL HISTORY OF -       D&C     SURGICAL HISTORY OF -   1984    Tubal Ligation     SURGICAL HISTORY OF -   1987    Arthroscopy (R) Knee     Current Outpatient Prescriptions   Medication Sig Dispense Refill     citalopram (CELEXA) 20 MG tablet Take 1 tablet (20 mg) by mouth daily 30 tablet 11     Aspirin-Acetaminophen-Caffeine (EXCEDRIN PO) Take by mouth as needed        GLUCOSAMINE-CHONDROITIN PO Take 2 tablets by mouth daily       naproxen (NAPROSYN) 500 MG tablet Take 1 tablet (500 mg) by mouth 2 times daily as needed for moderate pain       fluticasone (FLONASE) 50 MCG/ACT nasal spray Spray 1-2 sprays into both nostrils daily 1 g 0     MULTIVITAMIN TABS   OR 1 TABLET DAILY       OTC products: None, except as noted above    Allergies   Allergen Reactions     Nkda [No Known Drug Allergies]       Latex Allergy: NO    Social History   Substance Use Topics     Smoking status: Former Smoker     Years: 6.00     Types: Cigarettes     Quit date: 1/1/1984     Smokeless tobacco: Never Used      Comment: 23 years  "ago quit date     Alcohol use Yes      Comment: 3 drinks a weekly     History   Drug Use No     Family History :  ============  No family history of bleeding or anesthesia problems.     REVIEW OF SYSTEMS:                                                    ROS:  General: No change in weight, sleep or appetite.  Normal energy.  No fever or chills  Eyes: Negative for vision changes or eye problems  ENT: No problems with ears, nose or throat.  No difficulty swallowing.  Resp: No coughing, wheezing or shortness of breath  CV: No chest pains or palpitations  GI: No nausea, vomiting,  heartburn, abdominal pain, diarrhea, constipation or change in bowel habits  : No urinary frequency or dysuria, bladder or kidney problems  Musculoskeletal: POSITIVE  for:, pain neck and wrists and hands.   Neurologic: No headaches, numbness, tingling, weakness, problems with balance or coordination  Psychiatric: No problems with anxiety, depression or mental health.  She has been off citalopram for a couple months and has been doing well.   PHQ9 score today= 0, generalized anxiety disorder scale score today= 0  Heme/immune/allergy: POSITIVE  for:, anemia in the past  Endocrine: No history of thyroid disease, diabetes or other endocrine disorders  Skin: No rashes,worrisome lesions or skin problems     EXAM:                                                    OBJECTIVE:Blood pressure 130/80, pulse 70, temperature 98.3  F (36.8  C), temperature source Tympanic, resp. rate 14, height 5' 3\" (1.6 m), weight 113 lb (51.3 kg), last menstrual period 03/17/2008, not currently breastfeeding. BMI=Body mass index is 20.02 kg/(m^2).  GENERAL APPEARANCE ADULT: Alert, no acute distress  EYES: PERRL, EOM normal, conjunctiva and lids normal  HENT: Ears and TMs normal, oral mucosa and posterior oropharynx normal, edentulous  with denture plates  NECK: No adenopathy,masses or thyromegaly  RESP: lungs clear to auscultation   CV: normal rate, regular rhythm, no " murmur or gallop  ABDOMEN: soft, no organomegaly or tenderness.   She has a 4cm mass just left of midline above umbilicus.  Does not seem to reduce or change with straining.  May be lipoma.  Hernia less likely.  She reports unchanged for years and no symptoms.   MS: extremities normal, no peripheral edema     DIAGNOSTICS:                                                    EKG: Not indicated due to non-vascular surgery and low risk of event (age <65 and without cardiac risk factors)  No labs needed.     Recent Labs   Lab Test  01/17/17   1144  08/24/16   1415  07/26/16 2000   HGB  14.9   --   12.4   PLT  228   --   219   NA  138  139  146*   POTASSIUM  4.4  3.8  3.4   CR  0.84  0.76  0.84        IMPRESSION:                                                    Reason for surgery/procedure: carpal tunnel left wrist.     The proposed surgical procedure is considered INTERMEDIATE risk.    REVISED CARDIAC RISK INDEX  The patient has the following serious cardiovascular risks for perioperative complications such as (MI, PE, VFib and 3  AV Block):  No serious cardiac risks  INTERPRETATION: 0 risks: Class I (very low risk - 0.4% complication rate)    The patient has the following additional risks for perioperative complications:  No identified additional risks      ICD-10-CM    1. Preop general physical exam Z01.818    2. Carpal tunnel syndrome of left wrist G56.02        RECOMMENDATIONS:                                                      No aspirin for 7-10 days before surgery.   No naproxen or ibuprofen  three days before surgery.  Tylenol (acetaminophen) is oK.      APPROVAL GIVEN to proceed with proposed procedure, without further diagnostic evaluation       Signed Electronically by: Fausto Gregg MD    Copy of this evaluation report is provided to requesting physician.    [facility] Preop Guidelines

## 2017-12-05 NOTE — ANESTHESIA PREPROCEDURE EVALUATION
Anesthesia Evaluation     . Pt has had prior anesthetic. Type: General    No history of anesthetic complications          ROS/MED HX    ENT/Pulmonary:     (+)tobacco use, Past use , . .    Neurologic:  - neg neurologic ROS     Cardiovascular:  - neg cardiovascular ROS       METS/Exercise Tolerance:  >4 METS   Hematologic:  - neg hematologic  ROS       Musculoskeletal:         GI/Hepatic:  - neg GI/hepatic ROS       Renal/Genitourinary:  - ROS Renal section negative       Endo:  - neg endo ROS       Psychiatric:     (+) psychiatric history depression      Infectious Disease:  - neg infectious disease ROS       Malignancy:      - no malignancy   Other:    - neg other ROS                 Physical Exam  Normal systems: cardiovascular and pulmonary    Airway   Mallampati: I  TM distance: >3 FB  Neck ROM: full    Dental   (+) upper dentures and lower dentures    Cardiovascular       Pulmonary                     Anesthesia Plan      History & Physical Review      ASA Status:  1 .    NPO Status:  > 8 hours    Plan for MAC, Peripheral Nerve Block, General and LMA with Intravenous and Propofol induction. Maintenance will be TIVA.  Reason for MAC:  Deep or markedly invasive procedure (G8)  PONV prophylaxis:  Ondansetron (or other 5HT-3) and Dexamethasone or Solumedrol  Left supraclavicular block      Postoperative Care  Postoperative pain management:  IV analgesics, Oral pain medications and Peripheral nerve block (Single Shot).      Consents  Anesthetic plan, risks, benefits and alternatives discussed with:  Patient..                          .

## 2017-12-05 NOTE — IP AVS SNAPSHOT
MRN:[id number]                      After Visit Summary   12/5/2017    Lauren Tam    MRN: [id number]           Thank you!     Thank you for choosing [facility] for your care. Our goal is always to provide you with excellent care. Hearing back from our patients is one way we can continue to improve our services. Please take a few minutes to complete the written survey that you may receive in the mail after you visit with us. Thank you!        Patient Information     Date Of Birth          1960        About your hospital stay     You were admitted on:  December 5, 2017 You last received care in the:  [facility] PreOP/Phase II    You were discharged on:  December 5, 2017       Who to Call     For medical emergencies, please call 911.  For non-urgent questions about your medical care, please call your primary care provider or clinic, [phone]  For questions related to your surgery, please call your surgery clinic        Attending Provider     Provider Specialty    Eric Berry MD Hand Surgery       Primary Care Provider Office Phone # Fax #    Fausto Gregg [phone]      After Care Instructions      Diet as Tolerated       Return to diet before surgery, unless instructed otherwise.            Discharge Instructions       Review outpatient procedure discharge instructions with patient as directed by Provider            Ice to affected area       Ice pack to surgical site every 15 minutes per hour for 24 hours once nerve block has worn off.            No Alcohol       For 24 hours post procedure            No Dressing Change       No dressing change until follow up appointment.            No driving or operating machinery        until the day after procedure            No weight bearing           Notify Provider       For signs and symptoms of infection: Fever greater than 101, redness, swelling, heat at site, drainage, pus.            Return to clinic     "   Return to clinic in 1-2 weeks or as scheduled.            Shower        Cover dressing if dressing is not going to be changed today                  Your next 10 appointments already scheduled     Dec 19, 2017  2:15 PM RONI LOMAX SCREENING DIGITAL BILATERAL with [facility]   [facility] ([facility])    [address]  [facility] [address]   [phone]           Do not use any powder, lotion or deodorant under your arms or on your breast. If you do, we will ask you to remove it before your exam.  Wear comfortable, two-piece clothing.  If you have any allergies, tell your care team.  Bring any previous mammograms from other facilities or have them mailed to the breast center. Three-dimensional (3D) mammograms are available at [facility] locations in [facility], [facility], [facility], and [place]. [facility] locations include [place] and [facility] in [place]. Benefits of 3D mammograms include: - Improved rate of cancer detection - Decreases your chance of having to go back for more tests, which means fewer: - \"False-positive\" results (This means that there is an abnormal area but it isn't cancer.) - Invasive testing procedures, such as a biopsy or surgery - Can provide clearer images of the breast if you have dense breast tissue. 3D mammography is an optional exam that anyone can have with a 2D mammogram. It doesn't replace or take the place of a 2D mammogram. 2D mammograms remain an effective screening test for all women.  Not all insurance companies cover the cost of a 3D mammogram. Check with your insurance.              Further instructions from your care team                           Same Day Surgery Discharge Instructions  Special Precautions After Surgery - Adult    1. It is not unusual to feel lightheaded or faint, up to 24 hours after surgery or while taking pain medication.  If you have these symptoms; sit for a few " "minutes before standing and have someone assist you when getting up.  2. You should rest and relax for the next 24 hours and must have someone stay with you for at least 24 hours after your discharge.  3. DO NOT DRIVE any vehicle or operate mechanical equipment for 24 hours following the end of your surgery.  DO NOT DRIVE while taking narcotic pain medications that have been prescribed by your physician.  If you had a limb operated on, you must be able to use it fully to drive.  4. DO NOT drink alcoholic beverages for 24 hours following surgery or while taking prescription pain medication.  5. Drink clear liquids (apple juice, ginger ale, broth, 7-Up, etc.).  Progress to your regular diet as you feel able.  6. Any questions call your physician and do not make important decisions for 24 hours.                                             ___________________________________________________________________________________________________________________________  IMPORTANT NUMBERS:    [facility] Main Number:  [phone], [phone]  Pharmacy:  [phone]  Same Day Surgery:  [phone], Monday - Friday until 8:30 p.m.  Urgent Care:  [phone]  Emergency Room:  [phone]      [facility]:  [phone]                                                                             [facility]:  [phone] option 1  [facility]:  [phone]     OB Clinic:  [phone]   Surgery Specialty Clinic:  [phone]   Home Medical Equipment: [phone]  [facility]:  [phone]        Pending Results     No orders found from 12/3/2017 to 12/6/2017.            Admission Information     Date & Time Provider Department Dept. Phone    12/5/2017 Eric Berry MD [facility] PreOP/Phase [phone]      Your Vitals Were     Blood Pressure Temperature Respirations Height Weight Last Period    123/70 98  F (36.7  C) (Oral) 14 1.6 m (5' 3\") 51.3 kg (113 lb) " 03/17/2008    Pulse Oximetry BMI (Body Mass Index)                94% 20.02 kg/m2          [employer] Information     [employer] gives you secure access to your electronic health record. If you see a primary care provider, you can also send messages to your care team and make appointments. If you have questions, please call your primary care clinic.  If you do not have a primary care provider, please call [phone] and they will assist you.        Care EveryWhere ID     This is your Care EveryWhere ID. This could be used by other organizations to access your [facility] medical records  [id number]        Equal Access to Services     [facility]: Hadmatt Verde, arabella vázquez, bethany rossi, jaqui hoang. [facility] [phone].    ATENCIÓN: Si habla español, tiene a kauffman disposición servicios gratuitos de asistencia lingüística. [facility] [phone].    We comply with applicable federal civil rights laws and [place] laws. We do not discriminate on the basis of race, color, national origin, age, disability, sex, sexual orientation, or gender identity.               Review of your medicines      START taking        Dose / Directions    acetaminophen 325 MG tablet   Commonly known as:  TYLENOL   Used for:  Arthritis of carpometacarpal (CMC) joint of left thumb        Dose:  975 mg   Take 3 tablets (975 mg) by mouth every 8 hours   Quantity:  100 tablet   Refills:  0       hydrOXYzine 25 MG tablet   Commonly known as:  ATARAX   Used for:  Arthritis of carpometacarpal (CMC) joint of left thumb        Dose:  25 mg   Take 1 tablet (25 mg) by mouth every 6 hours as needed for itching (and nausea)   Quantity:  30 tablet   Refills:  0       ondansetron 4 MG ODT tab   Commonly known as:  ZOFRAN-ODT   Used for:  Arthritis of carpometacarpal (CMC) joint of left thumb        Dose:  4-8 mg   Take 1-2 tablets (4-8 mg) by mouth every 8 hours as needed for nausea Dissolve ON the  tongue.   Quantity:  4 tablet   Refills:  0       oxyCODONE-acetaminophen 5-325 MG per tablet   Commonly known as:  PERCOCET   Used for:  Arthritis of carpometacarpal (CMC) joint of left thumb        Dose:  1-2 tablet   Take 1-2 tablets by mouth every 4 hours as needed for pain (moderate to severe)   Quantity:  40 tablet   Refills:  0         CONTINUE these medicines which have NOT CHANGED        Dose / Directions    EXCEDRIN PO        Take by mouth as needed   Refills:  0       fluticasone 50 MCG/ACT spray   Commonly known as:  FLONASE   Used for:  Acute sinusitis with symptoms > 10 days        Dose:  1-2 spray   Spray 1-2 sprays into both nostrils daily   Quantity:  1 g   Refills:  0       GLUCOSAMINE-CHONDROITIN PO        Dose:  2 tablet   Take 2 tablets by mouth daily   Refills:  0       MULTIVITAMIN TABS   OR        1 TABLET DAILY   Refills:  0       NAPROSYN 500 MG tablet   Used for:  Cervicalgia   Generic drug:  naproxen        Dose:  500 mg   Take 1 tablet (500 mg) by mouth 2 times daily as needed for moderate pain   Refills:  0            Where to get your medicines      These medications were sent to [facility] [place], [place] - [address]  [address] [address]     Phone:  [phone]     ondansetron 4 MG ODT tab         Some of these will need a paper prescription and others can be bought over the counter. Ask your nurse if you have questions.     You don't need a prescription for these medications     acetaminophen 325 MG tablet    hydrOXYzine 25 MG tablet    oxyCODONE-acetaminophen 5-325 MG per tablet                Protect others around you: Learn how to safely use, store and throw away your medicines at www.disposemymeds.org.             Medication List: This is a list of all your medications and when to take them. Check marks below indicate your daily home schedule. Keep this list as a reference.      Medications           Morning Afternoon Evening Bedtime As  Needed    acetaminophen 325 MG tablet   Commonly known as:  TYLENOL   Take 3 tablets (975 mg) by mouth every 8 hours   Last time this was given:  975 mg on 12/5/2017  1:34 PM                                EXCEDRIN PO   Take by mouth as needed                                fluticasone 50 MCG/ACT spray   Commonly known as:  FLONASE   Spray 1-2 sprays into both nostrils daily                                GLUCOSAMINE-CHONDROITIN PO   Take 2 tablets by mouth daily                                hydrOXYzine 25 MG tablet   Commonly known as:  ATARAX   Take 1 tablet (25 mg) by mouth every 6 hours as needed for itching (and nausea)                                MULTIVITAMIN TABS   OR   1 TABLET DAILY                                NAPROSYN 500 MG tablet   Take 1 tablet (500 mg) by mouth 2 times daily as needed for moderate pain   Generic drug:  naproxen                                ondansetron 4 MG ODT tab   Commonly known as:  ZOFRAN-ODT   Take 1-2 tablets (4-8 mg) by mouth every 8 hours as needed for nausea Dissolve ON the tongue.                                oxyCODONE-acetaminophen 5-325 MG per tablet   Commonly known as:  PERCOCET   Take 1-2 tablets by mouth every 4 hours as needed for pain (moderate to severe)

## 2017-12-06 NOTE — DISCHARGE INSTRUCTIONS
Same Day Surgery Discharge Instructions  Special Precautions After Surgery - Adult    1. It is not unusual to feel lightheaded or faint, up to 24 hours after surgery or while taking pain medication.  If you have these symptoms; sit for a few minutes before standing and have someone assist you when getting up.  2. You should rest and relax for the next 24 hours and must have someone stay with you for at least 24 hours after your discharge.  3. DO NOT DRIVE any vehicle or operate mechanical equipment for 24 hours following the end of your surgery.  DO NOT DRIVE while taking narcotic pain medications that have been prescribed by your physician.  If you had a limb operated on, you must be able to use it fully to drive.  4. DO NOT drink alcoholic beverages for 24 hours following surgery or while taking prescription pain medication.  5. Drink clear liquids (apple juice, ginger ale, broth, 7-Up, etc.).  Progress to your regular diet as you feel able.  6. Any questions call your physician and do not make important decisions for 24 hours.                                             ___________________________________________________________________________________________________________________________  IMPORTANT NUMBERS:    [facility] Main Number:  [phone], [phone]  Pharmacy:  [phone]  Same Day Surgery:  [phone], Monday - Friday until 8:30 p.m.  Urgent Care:  [phone]  Emergency Room:  [phone]      [facility]:  [phone]                                                                             [facility]:  [phone] option 1  [facility]:  [phone]     OB Clinic:  [phone]   Surgery Specialty Clinic:  [phone]   Home Medical Equipment: [phone]  [facility]:  [phone]

## 2017-12-06 NOTE — ANESTHESIA POSTPROCEDURE EVALUATION
Patient: Lauren WHITE Anel    Procedure(s):  Left thumb ligament reconstruction tendon interposition with hemitrapezoidectomy and thumb metacarpophalangeal joint volar capsulodesis - Wound Class: I-Clean    Diagnosis:Left thumb CMC arthritis  Diagnosis Additional Information: No value filed.    Anesthesia Type:  MAC, Peripheral Nerve Block, General, LMA    Note:  Anesthesia Post Evaluation    Patient location during evaluation: Phase 2  Patient participation: Able to fully participate in evaluation  Level of consciousness: awake and alert  Pain management: adequate  Airway patency: patent  Cardiovascular status: acceptable and hemodynamically stable  Respiratory status: acceptable, room air and spontaneous ventilation  Hydration status: acceptable  PONV: none     Anesthetic complications: None          Last vitals:  Vitals:    12/05/17 1909 12/05/17 1926 12/05/17 1928   BP:  123/70    Resp:  14    Temp:      SpO2: 98%  94%         Electronically Signed By: DAVID Madrid CRNA  December 5, 2017  8:10 PM

## 2017-12-06 NOTE — ANESTHESIA CARE TRANSFER NOTE
Patient: Lauren WHITE Anel    Procedure(s):  Left thumb ligament reconstruction tendon interposition with hemitrapezoidectomy and thumb metacarpophalangeal joint volar capsulodesis - Wound Class: I-Clean    Diagnosis: Left thumb CMC arthritis  Diagnosis Additional Information: No value filed.    Anesthesia Type:   MAC, Peripheral Nerve Block, General, LMA     Note:  Airway :Nasal Cannula  Patient transferred to:PACU  Handoff Report: Identifed the Patient, Identified the Reponsible Provider, Reviewed the pertinent medical history, Discussed the surgical course, Reviewed Intra-OP anesthesia mangement and issues during anesthesia, Set expectations for post-procedure period and Allowed opportunity for questions and acknowledgement of understanding      Vitals: (Last set prior to Anesthesia Care Transfer)    CRNA VITALS  12/5/2017 1732 - 12/5/2017 1813      12/5/2017             EKG: NSR                Electronically Signed By: DAVID Richards CRNA  December 5, 2017  6:13 PM

## 2017-12-11 LAB — COPATH REPORT: NORMAL

## 2017-12-15 ENCOUNTER — TRANSFERRED RECORDS (OUTPATIENT)
Dept: HEALTH INFORMATION MANAGEMENT | Facility: CLINIC | Age: 57
End: 2017-12-15

## 2018-01-02 ENCOUNTER — TRANSFERRED RECORDS (OUTPATIENT)
Dept: HEALTH INFORMATION MANAGEMENT | Facility: CLINIC | Age: 58
End: 2018-01-02

## 2018-01-05 ENCOUNTER — HOSPITAL ENCOUNTER (OUTPATIENT)
Dept: MAMMOGRAPHY | Facility: CLINIC | Age: 58
Discharge: HOME | End: 2018-01-05
Attending: OBSTETRICS & GYNECOLOGY | Admitting: OBSTETRICS & GYNECOLOGY
Payer: COMMERCIAL

## 2018-01-05 DIAGNOSIS — Z12.31 VISIT FOR SCREENING MAMMOGRAM: ICD-10-CM

## 2018-01-05 PROCEDURE — 77063 BREAST TOMOSYNTHESIS BI: CPT

## 2018-01-23 ENCOUNTER — TRANSFERRED RECORDS (OUTPATIENT)
Dept: HEALTH INFORMATION MANAGEMENT | Facility: CLINIC | Age: 58
End: 2018-01-23

## 2018-02-27 ENCOUNTER — TRANSFERRED RECORDS (OUTPATIENT)
Dept: HEALTH INFORMATION MANAGEMENT | Facility: CLINIC | Age: 58
End: 2018-02-27

## 2018-03-14 ENCOUNTER — OFFICE VISIT (OUTPATIENT)
Dept: FAMILY MEDICINE | Facility: CLINIC | Age: 58
End: 2018-03-14
Payer: COMMERCIAL

## 2018-03-14 VITALS
HEART RATE: 75 BPM | HEIGHT: 63 IN | SYSTOLIC BLOOD PRESSURE: 122 MMHG | RESPIRATION RATE: 18 BRPM | WEIGHT: 118 LBS | DIASTOLIC BLOOD PRESSURE: 80 MMHG | TEMPERATURE: 98.5 F | OXYGEN SATURATION: 97 % | BODY MASS INDEX: 20.91 KG/M2

## 2018-03-14 DIAGNOSIS — R07.0 THROAT PAIN: ICD-10-CM

## 2018-03-14 DIAGNOSIS — J11.1 INFLUENZA-LIKE ILLNESS: Primary | ICD-10-CM

## 2018-03-14 DIAGNOSIS — J03.90 TONSILLITIS: ICD-10-CM

## 2018-03-14 LAB
DEPRECATED S PYO AG THROAT QL EIA: NORMAL
SPECIMEN SOURCE: NORMAL

## 2018-03-14 PROCEDURE — 99213 OFFICE O/P EST LOW 20 MIN: CPT | Performed by: NURSE PRACTITIONER

## 2018-03-14 PROCEDURE — 87081 CULTURE SCREEN ONLY: CPT | Performed by: NURSE PRACTITIONER

## 2018-03-14 PROCEDURE — 87880 STREP A ASSAY W/OPTIC: CPT | Performed by: NURSE PRACTITIONER

## 2018-03-14 RX ORDER — AMOXICILLIN 500 MG/1
500 CAPSULE ORAL 2 TIMES DAILY
Qty: 20 CAPSULE | Refills: 0 | Status: SHIPPED | OUTPATIENT
Start: 2018-03-14 | End: 2018-03-24

## 2018-03-14 ASSESSMENT — PAIN SCALES - GENERAL: PAINLEVEL: NO PAIN (0)

## 2018-03-14 NOTE — MR AVS SNAPSHOT
After Visit Summary   3/14/2018    Lauren Tam    MRN: [id number]           Patient Information     Date Of Birth          1960        Visit Information        Provider Department      3/14/2018 11:20 AM Lee Ann Brown APRN [facility]        Today's Diagnoses     Influenza-like illness    -  1    Throat pain        Tonsillitis          Care Instructions      Influenza (Adult)    Influenza is also called the flu. It is a viral illness that affects the air passages of your lungs. It is different from the common cold. The flu can easily be passed from one to person to another. It may be spread through the air by coughing and sneezing. Or it can be spread by touching the sick person and then touching your own eyes, nose, or mouth.  The flu starts 1 to 3 days after you are exposed to the flu virus. It may last for 1 to 2 weeks but many people feel tired or fatigued for many weeks afterward. You usually don t need to take antibiotics unless you have a complication. This might be an ear or sinus infection or pneumonia.  Symptoms of the flu may be mild or severe. They can include extreme tiredness (wanting to stay in bed all day), chills, fevers, muscle aches, soreness with eye movement, headache, and a dry, hacking cough.  Home care  Follow these guidelines when caring for yourself at home:    Avoid being around cigarette smoke, whether yours or other people s.    Acetaminophen or ibuprofen will help ease your fever, muscle aches, and headache. Don t give aspirin to anyone younger than 18 who has the flu. Aspirin can harm the liver.    Nausea and loss of appetite are common with the flu. Eat light meals. Drink 6 to 8 glasses of liquids every day. Good choices are water, sport drinks, soft drinks without caffeine, juices, tea, and soup. Extra fluids will also help loosen secretions in your nose and lungs.    Over-the-counter cold medicines will not make the flu go away  faster. But the medicines may help with coughing, sore throat, and congestion in your nose and sinuses. Don t use a decongestant if you have high blood pressure.    Stay home until your fever has been gone for at least 24 hours without using medicine to reduce fever.  Follow-up care  Follow up with your healthcare provider, or as advised, if you are not getting better over the next week.  If you are age 65 or older, talk with your provider about getting a pneumococcal vaccine every 5 years. You should also get this vaccine if you have chronic asthma or COPD. All adults should get a flu vaccine every fall. Ask your provider about this.  When to seek medical advice  Call your healthcare provider right away if any of these occur:    Cough with lots of colored mucus (sputum) or blood in your mucus    Chest pain, shortness of breath, wheezing, or trouble breathing    Severe headache, or face, neck, or ear pain    New rash with fever    Fever of 100.4 F (38 C) or higher, or as directed by your healthcare provider    Confusion, behavior change, or seizure    Severe weakness or dizziness    You get a new fever or cough after getting better for a few days  Date Last Reviewed: 1/1/2017 2000-2017 [employer]. [address] [place] [address]. All rights reserved. This information is not intended as a substitute for professional medical care. Always follow your healthcare professional's instructions.        Pharyngitis: Strep (Presumed)    You have pharyngitis (sore throat). The cause is thought to be the streptococcus, or strep, bacterium. Strep throat infection can cause throat pain that is worse when swallowing, aching all over, headache, and fever. The infection may be spread by coughing, kissing, or touching others after touching your mouth or nose. Antibiotic medications are given to treat the infection.  Home care    Rest at home. Drink plenty of fluids to avoid dehydration.    No work or school  for the first 2 days of taking the antibiotics. After this time, you will not be contagious. You can then return to work or school if you are feeling better.     The antibiotic medication must be taken for the full 10 days, even if you feel better. This is very important to ensure the infection is treated. It is also important to prevent drug-resistant organisms from developing. If you were given an antibiotic shot, no more antibiotics are needed.    You may use acetaminophen or ibuprofen to control pain or fever, unless another medicine was prescribed for this. If you have chronic liver or kidney disease or ever had a stomach ulcer or GI bleeding, talk with your doctor before using these medicines.    Throat lozenges or a throat-numbing sprays can help reduce throat pain. Gargling with warm salt water can also help. Dissolve 1/2 teaspoon of salt in 1 8 ounce glass of warm water.     Avoid salty or spicy foods, which can irritate the throat.  Follow-up care  Follow up with your healthcare provider or our staff if you are not improving over the next week.  When to seek medical advice  Call your healthcare provider right away if any of these occur:    Fever as directed by your doctor.     New or worsening ear pain, sinus pain, or headache    Painful lumps in the back of neck    Stiff neck    Lymph nodes are getting larger    Inability to swallow liquids, excessive drooling, or inability to open mouth wide due to throat pain    Signs of dehydration (very dark urine or no urine, sunken eyes, dizziness)    Trouble breathing or noisy breathing    Muffled voice    New rash  Date Last Reviewed: 4/13/2015 2000-2017 [employer] [facility]. [address] [place] [address], [place] [address]. All rights reserved. This information is not intended as a substitute for professional medical care. Always follow your healthcare professional's instructions.                Follow-ups after your visit        Who to contact     If you have  "questions or need follow up information about today's clinic visit or your schedule please contact [facility] directly at [phone].  Normal or non-critical lab and imaging results will be communicated to you by MyChart, letter or phone within 4 business days after the clinic has received the results. If you do not hear from us within 7 days, please contact the clinic through [employer] or phone. If you have a critical or abnormal lab result, we will notify you by phone as soon as possible.  Submit refill requests through [employer] or call your pharmacy and they will forward the refill request to us. Please allow 3 business days for your refill to be completed.          Additional Information About Your Visit        [employer] Information     [employer] gives you secure access to your electronic health record. If you see a primary care provider, you can also send messages to your care team and make appointments. If you have questions, please call your primary care clinic.  If you do not have a primary care provider, please call [phone] and they will assist you.        Care EveryWhere ID     This is your Care EveryWhere ID. This could be used by other organizations to access your [facility] medical records  [id number]        Your Vitals Were     Pulse Temperature Respirations Height Last Period Pulse Oximetry    75 98.5  F (36.9  C) (Tympanic) 18 5' 3\" (1.6 m) 03/17/2008 97%    BMI (Body Mass Index)                   20.9 kg/m2            Blood Pressure from Last 3 Encounters:   03/14/18 122/80   12/05/17 128/68   11/29/17 130/80    Weight from Last 3 Encounters:   03/14/18 118 lb (53.5 kg)   12/05/17 113 lb (51.3 kg)   11/29/17 113 lb (51.3 kg)              We Performed the Following     Beta strep group A culture     Rapid strep screen          Today's Medication Changes          These changes are accurate as of 3/14/18 11:56 AM.  If you have any questions, ask your nurse or doctor.             "   Start taking these medicines.        Dose/Directions    amoxicillin 500 MG capsule   Commonly known as:  AMOXIL   Used for:  Tonsillitis   Started by:  Lee Ann Brown APRN CNP        Dose:  500 mg   Take 1 capsule (500 mg) by mouth 2 times daily for 10 days   Quantity:  20 capsule   Refills:  0            Where to get your medicines      These medications were sent to [facility] [address] [address]     Phone:  [phone]     amoxicillin 500 MG capsule                Primary Care Provider Office Phone # Fax #    Fausto Gregg [phone]       [address] [place] [address]        Equal Access to Services     ANNELISE LOAIZA : Alejandro Verde, waovida gurpreet, qaaimeeta kaalmada flo, jaqui hoang. So [facility] [phone].    ATENCIÓN: Si habla español, tiene a kauffman disposición servicios gratuitos de asistencia lingüística. Llame al [phone].    We comply with applicable federal civil rights laws and [place] laws. We do not discriminate on the basis of race, color, national origin, age, disability, sex, sexual orientation, or gender identity.            Thank you!     Thank you for choosing [facility]  for your care. Our goal is always to provide you with excellent care. Hearing back from our patients is one way we can continue to improve our services. Please take a few minutes to complete the written survey that you may receive in the mail after your visit with us. Thank you!             Your Updated Medication List - Protect others around you: Learn how to safely use, store and throw away your medicines at www.disposemymeds.org.          This list is accurate as of 3/14/18 11:56 AM.  Always use your most recent med list.                   Brand Name Dispense Instructions for use Diagnosis    acetaminophen 325 MG tablet    TYLENOL    100  tablet    Take 3 tablets (975 mg) by mouth every 8 hours    Arthritis of carpometacarpal (CMC) joint of left thumb       amoxicillin 500 MG capsule    AMOXIL    20 capsule    Take 1 capsule (500 mg) by mouth 2 times daily for 10 days    Tonsillitis       EXCEDRIN PO      Take by mouth as needed        fluticasone 50 MCG/ACT spray    FLONASE    1 g    Spray 1-2 sprays into both nostrils daily    Acute sinusitis with symptoms > 10 days       GLUCOSAMINE-CHONDROITIN PO      Take 2 tablets by mouth daily        hydrOXYzine 25 MG tablet    ATARAX    30 tablet    Take 1 tablet (25 mg) by mouth every 6 hours as needed for itching (and nausea)    Arthritis of carpometacarpal (CMC) joint of left thumb       MULTIVITAMIN TABS   OR      1 TABLET DAILY        NAPROSYN 500 MG tablet   Generic drug:  naproxen      Take 1 tablet (500 mg) by mouth 2 times daily as needed for moderate pain    Cervicalgia

## 2018-03-14 NOTE — PROGRESS NOTES
SUBJECTIVE:   Lauren Tam is a 58 year old female who presents to clinic today for the following health issues:    ENT Symptoms             Symptoms: cc Present Absent Comment   Fever/Chills  x     Fatigue  x     Muscle Aches  x     Eye Irritation  x     Sneezing   x    Nasal Rudy/Drg  x     Sinus Pressure/Pain  x     Loss of smell   x    Dental pain   x    Sore Throat  x     Swollen Glands  x     Ear Pain/Fullness  x     Cough  x     Wheeze  x     Chest Pain   x    Shortness of breath  x     Rash   x    Other  x  headache     Symptom duration:  three days   Symptom severity:  moderate   Treatments tried:  aspirin   Contacts:  none         Problem list and histories reviewed & adjusted, as indicated.  Additional history: as documented    Patient Active Problem List   Diagnosis     CARDIOVASCULAR SCREENING; LDL GOAL LESS THAN 160     S/P LEEP of cervix-CIN2     Advanced directives, counseling/discussion     Severe episode of recurrent major depressive disorder, without psychotic features (H)     ASCUS with positive high risk human papillomavirus of vagina     S/P complete hysterectomy     Past Surgical History:   Procedure Laterality Date     COLONOSCOPY  3/2/2011    COLONOSCOPY performed by CESAR GONZALEZ at [facility]     CYSTOSCOPY N/A 10/12/2015    Procedure: CYSTOSCOPY;  Surgeon: Hunter Cruz MD;  Location: [facility]     INTERPOSITION TENDON HAND Left 12/5/2017    Procedure: INTERPOSITION TENDON HAND;  Left thumb ligament reconstruction tendon interposition with hemitrapezoidectomy and thumb metacarpophalangeal joint volar capsulodesis;  Surgeon: Eric Berry MD;  Location: [facility]     LAPAROSCOPIC HYSTERECTOMY TOTAL, BILATERAL SALPINGO-OOPHORECTOMY, COMBINED N/A 10/12/2015    Procedure: COMBINED LAPAROSCOPIC HYSTERECTOMY TOTAL, SALPINGO-OOPHORECTOMY;  Surgeon: Hunter Cruz MD;  Location: [facility]     LEEP TX, CERVICAL  6/23/15    ELAINA 1/2 with indeterminate margin     SURGICAL  HISTORY OF -   1967    T&A     SURGICAL HISTORY OF -       PE Tubes x6 starting 1967     SURGICAL HISTORY OF -   1979    Appy     SURGICAL HISTORY OF -       D&C     SURGICAL HISTORY OF -   1984    Tubal Ligation     SURGICAL HISTORY OF -   1987    Arthroscopy (R) Knee       Social History   Substance Use Topics     Smoking status: Former Smoker     Years: 6.00     Types: Cigarettes     Quit date: 1/1/1984     Smokeless tobacco: Never Used      Comment: 23 years ago quit date     Alcohol use Yes      Comment: 3 drinks a weekly     Family History   Problem Relation Age of Onset     Prostate Cancer Father      Aneurysm Father      addominal     HEART DISEASE Paternal Grandmother      Heart Failure Paternal Grandmother      CEREBROVASCULAR DISEASE Paternal Grandfather      Family History Negative Mother      Liver Disease Maternal Grandfather      HEART DISEASE Brother      HEART DISEASE Sister      HEART DISEASE Son      HEART DISEASE Daughter      HEART DISEASE Brother      HEART DISEASE Sister      HEART DISEASE Sister      HEART DISEASE Son          Current Outpatient Prescriptions   Medication Sig Dispense Refill     acetaminophen (TYLENOL) 325 MG tablet Take 3 tablets (975 mg) by mouth every 8 hours 100 tablet 0     hydrOXYzine (ATARAX) 25 MG tablet Take 1 tablet (25 mg) by mouth every 6 hours as needed for itching (and nausea) 30 tablet 0     Aspirin-Acetaminophen-Caffeine (EXCEDRIN PO) Take by mouth as needed        GLUCOSAMINE-CHONDROITIN PO Take 2 tablets by mouth daily       naproxen (NAPROSYN) 500 MG tablet Take 1 tablet (500 mg) by mouth 2 times daily as needed for moderate pain       fluticasone (FLONASE) 50 MCG/ACT nasal spray Spray 1-2 sprays into both nostrils daily 1 g 0     MULTIVITAMIN TABS   OR 1 TABLET DAILY       Allergies   Allergen Reactions     Nkda [No Known Drug Allergies]        Reviewed and updated as needed this visit by clinical staff       Reviewed and updated as needed this visit by  "Provider         ROS:  Constitutional, HEENT, cardiovascular, pulmonary, gi and gu systems are negative, except as otherwise noted.    OBJECTIVE:     /80 (BP Location: Right arm, Cuff Size: Adult Regular)  Pulse 75  Temp 98.5  F (36.9  C) (Tympanic)  Resp 18  Ht 5' 3\" (1.6 m)  Wt 118 lb (53.5 kg)  LMP 03/17/2008  SpO2 97%  BMI 20.9 kg/m2  Body mass index is 20.9 kg/(m^2).   GENERAL: healthy, alert and no distress  EYES: Eyes grossly normal to inspection, PERRL and conjunctivae and sclerae normal  HENT: ear canals and TM's normal, nose and mouth without ulcers or lesions.  Oropharynx erythematous.  NECK: no adenopathy, no asymmetry, masses, or scars and thyroid normal to palpation  RESP: lungs clear to auscultation - no rales, rhonchi or wheezes  CV: regular rate and rhythm, normal S1 S2, no S3 or S4, no murmur, click or rub, no peripheral edema and peripheral pulses strong  ABDOMEN: soft, nontender, no hepatosplenomegaly, no masses and bowel sounds normal  MS: no gross musculoskeletal defects noted, no edema  SKIN: no suspicious lesions or rashes  NEURO: Normal strength and tone, mentation intact and speech normal  PSYCH: mentation appears normal, affect normal/bright    Results for orders placed or performed in visit on 03/14/18   Rapid strep screen   Result Value Ref Range    Specimen Description Throat     Rapid Strep A Screen       NEGATIVE: No Group A streptococcal antigen detected by immunoassay, await culture report.   Beta strep group A culture   Result Value Ref Range    Specimen Description Throat     Culture Micro No beta hemolytic Streptococcus Group A isolated          ASSESSMENT:       ICD-10-CM    1. Influenza-like illness R69 Beta strep group A culture   2. Tonsillitis J03.90 amoxicillin (AMOXIL) 500 MG capsule   3. Throat pain R07.0 Rapid strep screen     Beta strep group A culture         PLAN:     Patient Instructions     Influenza (Adult)    Influenza is also called the flu. It is a " viral illness that affects the air passages of your lungs. It is different from the common cold. The flu can easily be passed from one to person to another. It may be spread through the air by coughing and sneezing. Or it can be spread by touching the sick person and then touching your own eyes, nose, or mouth.  The flu starts 1 to 3 days after you are exposed to the flu virus. It may last for 1 to 2 weeks but many people feel tired or fatigued for many weeks afterward. You usually don t need to take antibiotics unless you have a complication. This might be an ear or sinus infection or pneumonia.  Symptoms of the flu may be mild or severe. They can include extreme tiredness (wanting to stay in bed all day), chills, fevers, muscle aches, soreness with eye movement, headache, and a dry, hacking cough.  Home care  Follow these guidelines when caring for yourself at home:    Avoid being around cigarette smoke, whether yours or other people s.    Acetaminophen or ibuprofen will help ease your fever, muscle aches, and headache. Don t give aspirin to anyone younger than 18 who has the flu. Aspirin can harm the liver.    Nausea and loss of appetite are common with the flu. Eat light meals. Drink 6 to 8 glasses of liquids every day. Good choices are water, sport drinks, soft drinks without caffeine, juices, tea, and soup. Extra fluids will also help loosen secretions in your nose and lungs.    Over-the-counter cold medicines will not make the flu go away faster. But the medicines may help with coughing, sore throat, and congestion in your nose and sinuses. Don t use a decongestant if you have high blood pressure.    Stay home until your fever has been gone for at least 24 hours without using medicine to reduce fever.  Follow-up care  Follow up with your healthcare provider, or as advised, if you are not getting better over the next week.  If you are age 65 or older, talk with your provider about getting a pneumococcal  vaccine every 5 years. You should also get this vaccine if you have chronic asthma or COPD. All adults should get a flu vaccine every fall. Ask your provider about this.  When to seek medical advice  Call your healthcare provider right away if any of these occur:    Cough with lots of colored mucus (sputum) or blood in your mucus    Chest pain, shortness of breath, wheezing, or trouble breathing    Severe headache, or face, neck, or ear pain    New rash with fever    Fever of 100.4 F (38 C) or higher, or as directed by your healthcare provider    Confusion, behavior change, or seizure    Severe weakness or dizziness    You get a new fever or cough after getting better for a few days  Date Last Reviewed: 1/1/2017 2000-2017 [employer]. [address] [place] [address], [place] [address]. All rights reserved. This information is not intended as a substitute for professional medical care. Always follow your healthcare professional's instructions.        Pharyngitis: Strep (Presumed)    You have pharyngitis (sore throat). The cause is thought to be the streptococcus, or strep, bacterium. Strep throat infection can cause throat pain that is worse when swallowing, aching all over, headache, and fever. The infection may be spread by coughing, kissing, or touching others after touching your mouth or nose. Antibiotic medications are given to treat the infection.  Home care    Rest at home. Drink plenty of fluids to avoid dehydration.    No work or school for the first 2 days of taking the antibiotics. After this time, you will not be contagious. You can then return to work or school if you are feeling better.     The antibiotic medication must be taken for the full 10 days, even if you feel better. This is very important to ensure the infection is treated. It is also important to prevent drug-resistant organisms from developing. If you were given an antibiotic shot, no more antibiotics are needed.    You may use  acetaminophen or ibuprofen to control pain or fever, unless another medicine was prescribed for this. If you have chronic liver or kidney disease or ever had a stomach ulcer or GI bleeding, talk with your doctor before using these medicines.    Throat lozenges or a throat-numbing sprays can help reduce throat pain. Gargling with warm salt water can also help. Dissolve 1/2 teaspoon of salt in 1 8 ounce glass of warm water.     Avoid salty or spicy foods, which can irritate the throat.  Follow-up care  Follow up with your healthcare provider or our staff if you are not improving over the next week.  When to seek medical advice  Call your healthcare provider right away if any of these occur:    Fever as directed by your doctor.     New or worsening ear pain, sinus pain, or headache    Painful lumps in the back of neck    Stiff neck    Lymph nodes are getting larger    Inability to swallow liquids, excessive drooling, or inability to open mouth wide due to throat pain    Signs of dehydration (very dark urine or no urine, sunken eyes, dizziness)    Trouble breathing or noisy breathing    Muffled voice    New rash  Date Last Reviewed: 4/13/2015 2000-2017 [employer]. [address] [place] [address]. All rights reserved. This information is not intended as a substitute for professional medical care. Always follow your healthcare professional's instructions.            DAVID Townsend [facility]

## 2018-03-14 NOTE — PATIENT INSTRUCTIONS
Influenza (Adult)    Influenza is also called the flu. It is a viral illness that affects the air passages of your lungs. It is different from the common cold. The flu can easily be passed from one to person to another. It may be spread through the air by coughing and sneezing. Or it can be spread by touching the sick person and then touching your own eyes, nose, or mouth.  The flu starts 1 to 3 days after you are exposed to the flu virus. It may last for 1 to 2 weeks but many people feel tired or fatigued for many weeks afterward. You usually don t need to take antibiotics unless you have a complication. This might be an ear or sinus infection or pneumonia.  Symptoms of the flu may be mild or severe. They can include extreme tiredness (wanting to stay in bed all day), chills, fevers, muscle aches, soreness with eye movement, headache, and a dry, hacking cough.  Home care  Follow these guidelines when caring for yourself at home:    Avoid being around cigarette smoke, whether yours or other people s.    Acetaminophen or ibuprofen will help ease your fever, muscle aches, and headache. Don t give aspirin to anyone younger than 18 who has the flu. Aspirin can harm the liver.    Nausea and loss of appetite are common with the flu. Eat light meals. Drink 6 to 8 glasses of liquids every day. Good choices are water, sport drinks, soft drinks without caffeine, juices, tea, and soup. Extra fluids will also help loosen secretions in your nose and lungs.    Over-the-counter cold medicines will not make the flu go away faster. But the medicines may help with coughing, sore throat, and congestion in your nose and sinuses. Don t use a decongestant if you have high blood pressure.    Stay home until your fever has been gone for at least 24 hours without using medicine to reduce fever.  Follow-up care  Follow up with your healthcare provider, or as advised, if you are not getting better over the next week.  If you are age 65 or  older, talk with your provider about getting a pneumococcal vaccine every 5 years. You should also get this vaccine if you have chronic asthma or COPD. All adults should get a flu vaccine every fall. Ask your provider about this.  When to seek medical advice  Call your healthcare provider right away if any of these occur:    Cough with lots of colored mucus (sputum) or blood in your mucus    Chest pain, shortness of breath, wheezing, or trouble breathing    Severe headache, or face, neck, or ear pain    New rash with fever    Fever of 100.4 F (38 C) or higher, or as directed by your healthcare provider    Confusion, behavior change, or seizure    Severe weakness or dizziness    You get a new fever or cough after getting better for a few days  Date Last Reviewed: 1/1/2017 2000-2017 [employer]. [address] [place] [address], [place] [address]. All rights reserved. This information is not intended as a substitute for professional medical care. Always follow your healthcare professional's instructions.        Pharyngitis: Strep (Presumed)    You have pharyngitis (sore throat). The cause is thought to be the streptococcus, or strep, bacterium. Strep throat infection can cause throat pain that is worse when swallowing, aching all over, headache, and fever. The infection may be spread by coughing, kissing, or touching others after touching your mouth or nose. Antibiotic medications are given to treat the infection.  Home care    Rest at home. Drink plenty of fluids to avoid dehydration.    No work or school for the first 2 days of taking the antibiotics. After this time, you will not be contagious. You can then return to work or school if you are feeling better.     The antibiotic medication must be taken for the full 10 days, even if you feel better. This is very important to ensure the infection is treated. It is also important to prevent drug-resistant organisms from developing. If you were given an  antibiotic shot, no more antibiotics are needed.    You may use acetaminophen or ibuprofen to control pain or fever, unless another medicine was prescribed for this. If you have chronic liver or kidney disease or ever had a stomach ulcer or GI bleeding, talk with your doctor before using these medicines.    Throat lozenges or a throat-numbing sprays can help reduce throat pain. Gargling with warm salt water can also help. Dissolve 1/2 teaspoon of salt in 1 8 ounce glass of warm water.     Avoid salty or spicy foods, which can irritate the throat.  Follow-up care  Follow up with your healthcare provider or our staff if you are not improving over the next week.  When to seek medical advice  Call your healthcare provider right away if any of these occur:    Fever as directed by your doctor.     New or worsening ear pain, sinus pain, or headache    Painful lumps in the back of neck    Stiff neck    Lymph nodes are getting larger    Inability to swallow liquids, excessive drooling, or inability to open mouth wide due to throat pain    Signs of dehydration (very dark urine or no urine, sunken eyes, dizziness)    Trouble breathing or noisy breathing    Muffled voice    New rash  Date Last Reviewed: 4/13/2015 2000-2017 [employer]. [address] [place] [address], [place] [address]. All rights reserved. This information is not intended as a substitute for professional medical care. Always follow your healthcare professional's instructions.

## 2018-03-14 NOTE — NURSING NOTE
"Chief Complaint   Patient presents with     Throat Pain       Initial /80 (BP Location: Right arm, Cuff Size: Adult Regular)  Pulse 75  Temp 98.5  F (36.9  C) (Tympanic)  Resp 18  Ht 5' 3\" (1.6 m)  Wt 118 lb (53.5 kg)  LMP 03/17/2008  SpO2 97%  BMI 20.9 kg/m2 Estimated body mass index is 20.9 kg/(m^2) as calculated from the following:    Height as of this encounter: 5' 3\" (1.6 m).    Weight as of this encounter: 118 lb (53.5 kg).      Health Maintenance that is potentially due pending provider review:  NONE    Is there anyone who you would like to be able to receive your results? No  If yes have patient fill out YOBANY      "

## 2018-03-14 NOTE — LETTER
March 16, 2018      Lauren Tam  [address]   [facility] [id number]        Dear Lauren,         The results of your recent throat culture were negative.  If you have any questions or concerns please call your care team at the number listed at the top of this letter.          Sincerely,        DAVID Townsend CNP

## 2018-03-15 LAB
BACTERIA SPEC CULT: NORMAL
SPECIMEN SOURCE: NORMAL

## 2018-12-22 ENCOUNTER — OFFICE VISIT (OUTPATIENT)
Dept: URGENT CARE | Facility: URGENT CARE | Age: 58
End: 2018-12-22
Payer: COMMERCIAL

## 2018-12-22 VITALS
BODY MASS INDEX: 21.08 KG/M2 | DIASTOLIC BLOOD PRESSURE: 56 MMHG | SYSTOLIC BLOOD PRESSURE: 108 MMHG | RESPIRATION RATE: 14 BRPM | TEMPERATURE: 98.3 F | HEART RATE: 88 BPM | WEIGHT: 119 LBS

## 2018-12-22 DIAGNOSIS — K29.00 ACUTE GASTRITIS WITHOUT HEMORRHAGE, UNSPECIFIED GASTRITIS TYPE: Primary | ICD-10-CM

## 2018-12-22 DIAGNOSIS — R11.0 NAUSEA: ICD-10-CM

## 2018-12-22 PROCEDURE — 99214 OFFICE O/P EST MOD 30 MIN: CPT | Performed by: PHYSICIAN ASSISTANT

## 2018-12-22 RX ORDER — ONDANSETRON 4 MG/1
4 TABLET, ORALLY DISINTEGRATING ORAL EVERY 8 HOURS PRN
Qty: 20 TABLET | Refills: 1 | Status: SHIPPED | OUTPATIENT
Start: 2018-12-22 | End: 2018-12-29

## 2018-12-22 RX ORDER — CLARITHROMYCIN 500 MG
500 TABLET ORAL 2 TIMES DAILY
Qty: 28 TABLET | Refills: 0 | Status: SHIPPED | OUTPATIENT
Start: 2018-12-22 | End: 2019-01-05

## 2018-12-22 RX ORDER — AMOXICILLIN 500 MG/1
1000 CAPSULE ORAL 2 TIMES DAILY
Qty: 56 CAPSULE | Refills: 0 | Status: SHIPPED | OUTPATIENT
Start: 2018-12-22 | End: 2019-01-05

## 2018-12-22 ASSESSMENT — ENCOUNTER SYMPTOMS
EYE PAIN: 0
ARTHRALGIAS: 0
CHILLS: 0
BACK PAIN: 0
MYALGIAS: 0
SINUS PRESSURE: 0
RHINORRHEA: 0
FEVER: 0
TROUBLE SWALLOWING: 0
PALPITATIONS: 0
SHORTNESS OF BREATH: 0
UNEXPECTED WEIGHT CHANGE: 0
WHEEZING: 0
CHEST TIGHTNESS: 0
COUGH: 0
SORE THROAT: 0
DIARRHEA: 0
VOMITING: 0
EYE REDNESS: 0
FATIGUE: 0

## 2018-12-22 NOTE — NURSING NOTE
"Chief Complaint   Patient presents with     Nausea     started couple months ago.  Mouth has sour taste and smell.  Stomach is very nauseated and feels like could vomit.  Diarrhea off and on.  Fiance dx with H. pylori recently.        Initial /56 (BP Location: Right arm, Patient Position: Chair, Cuff Size: Adult Regular)   Pulse 88   Temp 98.3  F (36.8  C) (Tympanic)   Resp 14   Wt 54 kg (119 lb)   LMP 03/17/2008   BMI 21.08 kg/m   Estimated body mass index is 21.08 kg/m  as calculated from the following:    Height as of 3/14/18: 1.6 m (5' 3\").    Weight as of this encounter: 54 kg (119 lb).    Patient presents to the clinic using No DME    Health Maintenance that is potentially due pending provider review:  NONE    n/a    Is there anyone who you would like to be able to receive your results? Not Applicable  If yes have patient fill out YOBANY    Elijah Cerna M.A.    "

## 2018-12-22 NOTE — PROGRESS NOTES
SUBJECTIVE:   Lauren Tam is a 58 year old female presenting with a chief complaint of   Chief Complaint   Patient presents with     Nausea     started couple months ago.  Mouth has sour taste and smell.  Stomach is very nauseated and feels like could vomit.  Diarrhea off and on.  Elisa dx with H. pylori recently.        She is an established patient of [facility].    Gastro    Onset of symptoms was 1-2 month(s) ago.  Course of illness is worsening.    Severity moderate  Current and Associated symptoms:  Sour taste, vomiting, diarrhea and nausea  Aggravating factors: eating.    Alleviating factors:nothing  Diarrhea: off and on, last episode was 2 days ago  Stools: normal now, not eating much  Vomitting: a few weeks ago  Appetite: decreased  Risk factors: fipretty diagnosed with h. Pylori       Patient reports that elisa was diagnosed with H. Pylori from recent upper endoscopy biopsy. She reports her symptoms started prior to his symptoms. She has epigastric pain and nausea. She has been losing wt due to decreased appetite. No blood in stool or black/tarry stools.     Review of Systems   Constitutional: Negative for chills, fatigue, fever and unexpected weight change.   HENT: Negative for congestion, ear pain, postnasal drip, rhinorrhea, sinus pressure, sore throat and trouble swallowing.    Eyes: Negative for pain, redness and visual disturbance.   Respiratory: Negative for cough, chest tightness, shortness of breath and wheezing.    Cardiovascular: Negative for chest pain and palpitations.   Gastrointestinal: Positive for abdominal pain and nausea. Negative for diarrhea and vomiting.   Musculoskeletal: Negative for arthralgias, back pain and myalgias.   Skin: Negative for rash.       Past Medical History:   Diagnosis Date     ASCUS with positive high risk HPV cervical 12/2/16     Cervical high risk HPV (human papillomavirus) test positive 1/16/2015 1/9/2015:Pap--NIL, +HR HPV type 18 with an additional HR HPV  type identified as well. Plan [facility]-      Excessive or frequent menstruation      H/O colposcopy with cervical biopsy 6/5/2015    Bx - benign, ECC - suspicious for HSIL     Family History   Problem Relation Age of Onset     Prostate Cancer Father      Aneurysm Father         addominal     Heart Disease Paternal Grandmother      Heart Failure Paternal Grandmother      Cerebrovascular Disease Paternal Grandfather      Family History Negative Mother      Liver Disease Maternal Grandfather      Heart Disease Brother      Heart Disease Sister      Heart Disease Son      Heart Disease Daughter      Heart Disease Brother      Heart Disease Sister      Heart Disease Sister      Heart Disease Son      Current Outpatient Medications   Medication Sig Dispense Refill     acetaminophen (TYLENOL) 325 MG tablet Take 3 tablets (975 mg) by mouth every 8 hours 100 tablet 0     amoxicillin (AMOXIL) 500 MG capsule Take 2 capsules (1,000 mg) by mouth 2 times daily for 14 days 56 capsule 0     Aspirin-Acetaminophen-Caffeine (EXCEDRIN PO) Take by mouth as needed        clarithromycin (BIAXIN) 500 MG tablet Take 1 tablet (500 mg) by mouth 2 times daily for 14 days 28 tablet 0     fluticasone (FLONASE) 50 MCG/ACT nasal spray Spray 1-2 sprays into both nostrils daily 1 g 0     GLUCOSAMINE-CHONDROITIN PO Take 2 tablets by mouth daily       hydrOXYzine (ATARAX) 25 MG tablet Take 1 tablet (25 mg) by mouth every 6 hours as needed for itching (and nausea) 30 tablet 0     MULTIVITAMIN TABS   OR 1 TABLET DAILY       omeprazole (PRILOSEC) 20 MG DR capsule Take 1 capsule (20 mg) by mouth 2 times daily for 14 days 28 capsule 1     ondansetron (ZOFRAN-ODT) 4 MG ODT tab Take 1 tablet (4 mg) by mouth every 8 hours as needed for nausea 20 tablet 1     naproxen (NAPROSYN) 500 MG tablet Take 1 tablet (500 mg) by mouth 2 times daily as needed for moderate pain       Social History     Tobacco Use     Smoking status: Former Smoker     Years: 6.00     Types:  Cigarettes     Last attempt to quit: 1984     Years since quittin.0     Smokeless tobacco: Never Used     Tobacco comment: 23 years ago quit date   Substance Use Topics     Alcohol use: Yes     Comment: 3 drinks a weekly       OBJECTIVE  /56 (BP Location: Right arm, Patient Position: Chair, Cuff Size: Adult Regular)   Pulse 88   Temp 98.3  F (36.8  C) (Tympanic)   Resp 14   Wt 54 kg (119 lb)   LMP 2008   BMI 21.08 kg/m      Physical Exam   Constitutional: She appears well-developed and well-nourished.   HENT:   Head: Normocephalic.   Right Ear: Tympanic membrane and ear canal normal.   Left Ear: Tympanic membrane and ear canal normal.   Mouth/Throat: Oropharynx is clear and moist.   Eyes: Conjunctivae are normal. Pupils are equal, round, and reactive to light.   Cardiovascular: Normal rate and normal heart sounds.   Pulmonary/Chest: Effort normal and breath sounds normal.   Abdominal: Soft. Normal appearance and bowel sounds are normal. There is tenderness in the epigastric area. There is no rigidity, no rebound and no guarding.   Skin: Skin is warm and dry. No rash noted.   Psychiatric: She has a normal mood and affect. Her behavior is normal.       Labs:  No results found for this or any previous visit (from the past 24 hour(s)).    X-Ray was not done.    ASSESSMENT:      ICD-10-CM    1. Acute gastritis without hemorrhage, unspecified gastritis type K29.00 clarithromycin (BIAXIN) 500 MG tablet     amoxicillin (AMOXIL) 500 MG capsule     omeprazole (PRILOSEC) 20 MG DR capsule   2. Nausea R11.0 H Pylori antigen, stool     ondansetron (ZOFRAN-ODT) 4 MG ODT tab        Medical Decision Making:    Differential Diagnosis:  Abdominal Pain: GERD/Ulcer, gastritis, and h. Pylori infection    Serious Comorbid Conditions:  Adult:  None    PLAN:    Gastritis:  Will have her collect stool for H. Pylori testing. After stool is collected can start omeprazole, gave written Rx for amoxicillin and  clarithromycin x 14 days that she can start if symptoms worsen or if she is notified of a positive H. Pylori results. Discussed how to take these medications and what to expect.  Return to clinic if symptoms worsen or do not improve; otherwise follow up as needed      Nausea: can use zofran as needed.      Followup:    If not improving or if condition worsens, follow up with your Primary Care Provider    There are no Patient Instructions on file for this visit.

## 2018-12-23 DIAGNOSIS — R11.0 NAUSEA: ICD-10-CM

## 2018-12-23 PROCEDURE — 87338 HPYLORI STOOL AG IA: CPT | Performed by: PHYSICIAN ASSISTANT

## 2018-12-23 ASSESSMENT — ENCOUNTER SYMPTOMS
NAUSEA: 1
ABDOMINAL PAIN: 1

## 2018-12-24 LAB
H PYLORI AG STL QL IA: NORMAL
SPECIMEN SOURCE: NORMAL

## 2019-01-28 ENCOUNTER — HOSPITAL ENCOUNTER (OUTPATIENT)
Dept: MAMMOGRAPHY | Facility: CLINIC | Age: 59
Discharge: HOME | End: 2019-01-28
Attending: FAMILY MEDICINE | Admitting: FAMILY MEDICINE
Payer: COMMERCIAL

## 2019-01-28 ENCOUNTER — OFFICE VISIT (OUTPATIENT)
Dept: OBGYN | Facility: CLINIC | Age: 59
End: 2019-01-28
Payer: COMMERCIAL

## 2019-01-28 VITALS
WEIGHT: 115 LBS | HEART RATE: 88 BPM | BODY MASS INDEX: 20.37 KG/M2 | TEMPERATURE: 98 F | DIASTOLIC BLOOD PRESSURE: 60 MMHG | SYSTOLIC BLOOD PRESSURE: 132 MMHG

## 2019-01-28 DIAGNOSIS — Z01.419 ENCOUNTER FOR GYNECOLOGICAL EXAMINATION WITHOUT ABNORMAL FINDING: ICD-10-CM

## 2019-01-28 DIAGNOSIS — Z98.890 S/P LEEP OF CERVIX: ICD-10-CM

## 2019-01-28 DIAGNOSIS — Z00.00 ROUTINE HISTORY AND PHYSICAL EXAMINATION OF ADULT: Primary | ICD-10-CM

## 2019-01-28 DIAGNOSIS — Z12.31 VISIT FOR SCREENING MAMMOGRAM: ICD-10-CM

## 2019-01-28 PROCEDURE — G0145 SCR C/V CYTO,THINLAYER,RESCR: HCPCS | Performed by: OBSTETRICS & GYNECOLOGY

## 2019-01-28 PROCEDURE — G0476 HPV COMBO ASSAY CA SCREEN: HCPCS | Performed by: OBSTETRICS & GYNECOLOGY

## 2019-01-28 PROCEDURE — 99396 PREV VISIT EST AGE 40-64: CPT | Performed by: OBSTETRICS & GYNECOLOGY

## 2019-01-28 PROCEDURE — 77067 SCR MAMMO BI INCL CAD: CPT

## 2019-01-28 NOTE — NURSING NOTE
"Initial /60 (BP Location: Left arm, Patient Position: Chair, Cuff Size: Adult Regular)   Pulse 88   Temp 98  F (36.7  C) (Tympanic)   Wt 52.2 kg (115 lb)   LMP 03/17/2008   BMI 20.37 kg/m   Estimated body mass index is 20.37 kg/m  as calculated from the following:    Height as of 3/14/18: 1.6 m (5' 3\").    Weight as of this encounter: 52.2 kg (115 lb). .    Sahyla Preston    "

## 2019-01-28 NOTE — PROGRESS NOTES
SUBJECTIVE:   CC: Lauren Tam is an 58 year old woman who presents for preventive health visit.     Healthy Habits:    Do you get at least three servings of calcium containing foods daily (dairy, green leafy vegetables, etc.)? yes    Amount of exercise or daily activities, outside of work: 3 day(s) per week    Problems taking medications regularly No    Medication side effects: No    Have you had an eye exam in the past two years? yes    Do you see a dentist twice per year? yes    Do you have sleep apnea, excessive snoring or daytime drowsiness?no      none    Today's PHQ-2 Score:   PHQ-2 (  [employer]) 2017   Q1: Little interest or pleasure in doing things 0 0   Q2: Feeling down, depressed or hopeless 0 0   PHQ-2 Score 0 0       Abuse: Current or Past(Physical, Sexual or Emotional)- No  Do you feel safe in your environment? Yes    Social History     Tobacco Use     Smoking status: Former Smoker     Years: 6.00     Types: Cigarettes     Last attempt to quit: 1984     Years since quittin.0     Smokeless tobacco: Never Used     Tobacco comment: 23 years ago quit date   Substance Use Topics     Alcohol use: Yes     Comment: 3 drinks a weekly     If you drink alcohol do you typically have >3 drinks per day or >7 drinks per week? No                     Reviewed orders with patient.  Reviewed health maintenance and updated orders accordingly - Yes  BP Readings from Last 3 Encounters:   19 132/60   18 108/56   18 122/80    Wt Readings from Last 3 Encounters:   19 52.2 kg (115 lb)   18 54 kg (119 lb)   18 53.5 kg (118 lb)                  Patient Active Problem List   Diagnosis     CARDIOVASCULAR SCREENING; LDL GOAL LESS THAN 160     S/P LEEP of cervix-CIN2     Advanced directives, counseling/discussion     Severe episode of recurrent major depressive disorder, without psychotic features (H)     ASCUS with positive high risk human papillomavirus of vagina      S/P complete hysterectomy     Past Surgical History:   Procedure Laterality Date     COLONOSCOPY  3/2/2011    COLONOSCOPY performed by CESAR GONZALEZ at [facility]     CYSTOSCOPY N/A 10/12/2015    Procedure: CYSTOSCOPY;  Surgeon: Hunter Cruz MD;  Location: [facility]     INTERPOSITION TENDON HAND Left 2017    Procedure: INTERPOSITION TENDON HAND;  Left thumb ligament reconstruction tendon interposition with hemitrapezoidectomy and thumb metacarpophalangeal joint volar capsulodesis;  Surgeon: Eric Berry MD;  Location: [facility]     LAPAROSCOPIC HYSTERECTOMY TOTAL, BILATERAL SALPINGO-OOPHORECTOMY, COMBINED N/A 10/12/2015    Procedure: COMBINED LAPAROSCOPIC HYSTERECTOMY TOTAL, SALPINGO-OOPHORECTOMY;  Surgeon: Hunter Cruz MD;  Location: [facility]     LEEP TX, CERVICAL  6/23/15    ELAINA 1/2 with indeterminate margin     SURGICAL HISTORY OF -       T&A     SURGICAL HISTORY OF -       PE Tubes x6 starting      SURGICAL HISTORY OF -       Appy     SURGICAL HISTORY OF -       D&C     SURGICAL HISTORY OF -       Tubal Ligation     SURGICAL HISTORY OF -       Arthroscopy (R) Knee       Social History     Tobacco Use     Smoking status: Former Smoker     Years: 6.00     Types: Cigarettes     Last attempt to quit: 1984     Years since quittin.0     Smokeless tobacco: Never Used     Tobacco comment: 23 years ago quit date   Substance Use Topics     Alcohol use: Yes     Comment: 3 drinks a weekly     Family History   Problem Relation Age of Onset     Prostate Cancer Father      Aneurysm Father         addominal     Heart Disease Paternal Grandmother      Heart Failure Paternal Grandmother      Cerebrovascular Disease Paternal Grandfather      Family History Negative Mother      Liver Disease Maternal Grandfather      Heart Disease Brother      Heart Disease Sister      Heart Disease Son      Heart Disease Daughter      Heart Disease Brother      Heart Disease Sister       Heart Disease Sister      Heart Disease Son          Current Outpatient Medications   Medication Sig Dispense Refill     acetaminophen (TYLENOL) 325 MG tablet Take 3 tablets (975 mg) by mouth every 8 hours 100 tablet 0     Aspirin-Acetaminophen-Caffeine (EXCEDRIN PO) Take by mouth as needed        fluticasone (FLONASE) 50 MCG/ACT nasal spray Spray 1-2 sprays into both nostrils daily 1 g 0     GLUCOSAMINE-CHONDROITIN PO Take 2 tablets by mouth daily       hydrOXYzine (ATARAX) 25 MG tablet Take 1 tablet (25 mg) by mouth every 6 hours as needed for itching (and nausea) 30 tablet 0     MULTIVITAMIN TABS   OR 1 TABLET DAILY       naproxen (NAPROSYN) 500 MG tablet Take 1 tablet (500 mg) by mouth 2 times daily as needed for moderate pain       Allergies   Allergen Reactions     Nkda [No Known Drug Allergies]      Recent Labs   Lab Test 01/17/17  1144 08/24/16  1415 07/26/16  2000 06/03/15  0849   LDL  --   --   --  48   HDL  --   --   --  75   TRIG  --   --   --  59   ALT 16  --  17  --    CR 0.84 0.76 0.84  --    GFRESTIMATED 69 78 70  --    GFRESTBLACK 84 >90   GFR Calc   85  --    POTASSIUM 4.4 3.8 3.4  --    TSH  --  1.10  --   --         Mammogram Screening: Patient over age 50, mutual decision to screen reflected in health maintenance.    Pertinent mammograms are reviewed under the imaging tab.  History of abnormal Pap smear: YES - updated in Problem List and Health Maintenance accordingly  PAP / HPV Latest Ref Rng & Units 11/14/2017 12/2/2016 6/5/2015   PAP - NIL ASC-US(A) ASC-US(A)   HPV 16 DNA NEG:Negative Negative Negative Negative   HPV 18 DNA NEG:Negative Negative Negative Positive(A)   OTHER HR HPV NEG:Negative Negative Positive(A) Positive(A)     Reviewed and updated as needed this visit by clinical staff  Tobacco  Allergies  Meds  Med Hx  Surg Hx  Fam Hx  Soc Hx        Reviewed and updated as needed this visit by Provider        Past Medical History:   Diagnosis Date     ASCUS with  positive high risk HPV cervical 16     Cervical high risk HPV (human papillomavirus) test positive 2015:Pap--NIL, +HR HPV type 18 with an additional HR HPV type identified as well. Plan [place]-      Excessive or frequent menstruation      H/O colposcopy with cervical biopsy 2015    Bx - benign, ECC - suspicious for HSIL      Past Surgical History:   Procedure Laterality Date     COLONOSCOPY  3/2/2011    COLONOSCOPY performed by CESAR GONZALEZ at [facility]     CYSTOSCOPY N/A 10/12/2015    Procedure: CYSTOSCOPY;  Surgeon: Hunter Cruz MD;  Location: [facility]     INTERPOSITION TENDON HAND Left 2017    Procedure: INTERPOSITION TENDON HAND;  Left thumb ligament reconstruction tendon interposition with hemitrapezoidectomy and thumb metacarpophalangeal joint volar capsulodesis;  Surgeon: Eric Berry MD;  Location: [facility]     LAPAROSCOPIC HYSTERECTOMY TOTAL, BILATERAL SALPINGO-OOPHORECTOMY, COMBINED N/A 10/12/2015    Procedure: COMBINED LAPAROSCOPIC HYSTERECTOMY TOTAL, SALPINGO-OOPHORECTOMY;  Surgeon: Hunter Cruz MD;  Location: [facility]     LEEP TX, CERVICAL  6/23/15    ELAINA 1/2 with indeterminate margin     SURGICAL HISTORY OF -       T&A     SURGICAL HISTORY OF -       PE Tubes x6 starting      SURGICAL HISTORY OF -       Appy     SURGICAL HISTORY OF -       D&C     SURGICAL HISTORY OF -       Tubal Ligation     SURGICAL HISTORY OF -       Arthroscopy (R) Knee     Obstetric History       T3      L3     SAB1   TAB1   Ectopic0   Multiple0   Live Births0       # Outcome Date GA Lbr Kyle/2nd Weight Sex Delivery Anes PTL Lv   5 Term            4 Term            3 Term            2 TAB            1 SAB               Obstetric Comments    x 3       ROS:  CONSTITUTIONAL: NEGATIVE for fever, chills, change in weight  INTEGUMENTARY/SKIN: NEGATIVE for worrisome rashes, moles or lesions  EYES: NEGATIVE for vision changes or  irritation  ENT: NEGATIVE for ear, mouth and throat problems  RESP: NEGATIVE for significant cough or SOB  BREAST: NEGATIVE for masses, tenderness or discharge  CV: NEGATIVE for chest pain, palpitations or peripheral edema  GI: NEGATIVE for nausea, abdominal pain, heartburn, or change in bowel habits  : NEGATIVE for unusual urinary or vaginal symptoms. No vaginal bleeding.  MUSCULOSKELETAL: NEGATIVE for significant arthralgias or myalgia  NEURO: NEGATIVE for weakness, dizziness or paresthesias  PSYCHIATRIC: NEGATIVE for changes in mood or affect     OBJECTIVE:   /60 (BP Location: Left arm, Patient Position: Chair, Cuff Size: Adult Regular)   Pulse 88   Temp 98  F (36.7  C) (Tympanic)   Wt 52.2 kg (115 lb)   LMP 03/17/2008   BMI 20.37 kg/m    EXAM:  GENERAL: healthy, alert and no distress  HENT: atraumatic, normocephalic  NECK: no adenopathy, no asymmetry, masses, or scars and thyroid normal to palpation  RESP: lungs clear to auscultation - no rales, rhonchi or wheezes  BREAST: normal without masses, tenderness or nipple discharge and no palpable axillary masses or adenopathy  CV: regular rate and rhythm, normal S1 S2, no S3 or S4, no murmur, click or rub, no peripheral edema and peripheral pulses strong  ABDOMEN: soft, nontender, no hepatosplenomegaly, no masses and bowel sounds normal  PELVIC: Normal external female genitalia.  No external lesions, normal hair distribution, no adenopathy. Speculum exam reveals vaginal epithelium well rugated with no abnormal discharge. Vaginal cuff appears smooth, pink, with no visible lesions. Bimanual exam reveals surgically absent uterus without additional pelvic masses palpated. No adnexal masses palpated. No cervical motion tenderness.   MS: no gross musculoskeletal defects noted, no edema  SKIN: no suspicious lesions or rashes  NEURO: Normal strength and tone, mentation intact and speech normal  PSYCH: mentation appears normal, affect normal/bright    Diagnostic  "Test Results:  none     ASSESSMENT/PLAN:   1. Routine history and physical examination of adult  - Pap imaged thin layer screen with HPV - recommended age 30 - 65 years (select HPV order below)    2. Encounter for gynecological examination without abnormal finding        COUNSELING:   Reviewed preventive health counseling, as reflected in patient instructions       Regular exercise       Healthy diet/nutrition    BP Readings from Last 1 Encounters:   01/28/19 132/60     Estimated body mass index is 20.37 kg/m  as calculated from the following:    Height as of 3/14/18: 1.6 m (5' 3\").    Weight as of this encounter: 52.2 kg (115 lb).           reports that she quit smoking about 35 years ago. Her smoking use included cigarettes. She quit after 6.00 years of use. she has never used smokeless tobacco.      Counseling Resources:  ATP IV Guidelines  Pooled Cohorts Equation Calculator  Breast Cancer Risk Calculator  FRAX Risk Assessment  ICSI Preventive Guidelines  Dietary Guidelines for Americans, 2010  USDA's MyPlate  ASA Prophylaxis  Lung CA Screening    Hunter Cruz MD  [facility]  "

## 2019-01-30 LAB
COPATH REPORT: NORMAL
PAP: NORMAL

## 2019-02-01 LAB
FINAL DIAGNOSIS: NORMAL
HPV HR 12 DNA CVX QL NAA+PROBE: NEGATIVE
HPV16 DNA SPEC QL NAA+PROBE: NEGATIVE
HPV18 DNA SPEC QL NAA+PROBE: NEGATIVE
SPECIMEN DESCRIPTION: NORMAL
SPECIMEN SOURCE CVX/VAG CYTO: NORMAL

## 2019-02-04 ENCOUNTER — MYC MEDICAL ADVICE (OUTPATIENT)
Dept: FAMILY MEDICINE | Facility: CLINIC | Age: 59
End: 2019-02-04

## 2019-08-05 ENCOUNTER — OFFICE VISIT (OUTPATIENT)
Dept: FAMILY MEDICINE | Facility: CLINIC | Age: 59
End: 2019-08-05
Payer: COMMERCIAL

## 2019-08-05 VITALS
OXYGEN SATURATION: 100 % | RESPIRATION RATE: 16 BRPM | HEIGHT: 63 IN | HEART RATE: 77 BPM | BODY MASS INDEX: 18.96 KG/M2 | SYSTOLIC BLOOD PRESSURE: 132 MMHG | TEMPERATURE: 97.5 F | WEIGHT: 107 LBS | DIASTOLIC BLOOD PRESSURE: 87 MMHG

## 2019-08-05 DIAGNOSIS — J01.90 ACUTE SINUSITIS WITH SYMPTOMS > 10 DAYS: Primary | ICD-10-CM

## 2019-08-05 DIAGNOSIS — B37.9 ANTIBIOTIC-INDUCED YEAST INFECTION: ICD-10-CM

## 2019-08-05 DIAGNOSIS — T36.95XA ANTIBIOTIC-INDUCED YEAST INFECTION: ICD-10-CM

## 2019-08-05 PROCEDURE — 99213 OFFICE O/P EST LOW 20 MIN: CPT | Performed by: PHYSICIAN ASSISTANT

## 2019-08-05 RX ORDER — FLUCONAZOLE 150 MG/1
TABLET ORAL
Qty: 2 TABLET | Refills: 0 | Status: SHIPPED | OUTPATIENT
Start: 2019-08-05 | End: 2019-11-14

## 2019-08-05 RX ORDER — FLUTICASONE PROPIONATE 50 MCG
1 SPRAY, SUSPENSION (ML) NASAL 2 TIMES DAILY
Qty: 18.2 ML | Refills: 3 | Status: SHIPPED | OUTPATIENT
Start: 2019-08-05

## 2019-08-05 ASSESSMENT — ENCOUNTER SYMPTOMS
PALPITATIONS: 0
DIARRHEA: 0
BLURRED VISION: 0
COUGH: 0
SHORTNESS OF BREATH: 0
ABDOMINAL PAIN: 0
FEVER: 0
SORE THROAT: 0
WHEEZING: 0
SINUS PAIN: 1
HEADACHES: 1
NAUSEA: 0
VOMITING: 0
EYE DISCHARGE: 0
CHILLS: 0
MYALGIAS: 0
EYE REDNESS: 0

## 2019-08-05 ASSESSMENT — PATIENT HEALTH QUESTIONNAIRE - PHQ9: SUM OF ALL RESPONSES TO PHQ QUESTIONS 1-9: 0

## 2019-08-05 ASSESSMENT — MIFFLIN-ST. JEOR: SCORE: 1021.54

## 2019-08-05 NOTE — NURSING NOTE
"Chief Complaint   Patient presents with     Sinus Problem       Initial /87   Pulse 77   Temp 97.5  F (36.4  C) (Tympanic)   Resp 16   Ht 1.588 m (5' 2.5\")   Wt 48.5 kg (107 lb)   LMP 03/17/2008   SpO2 100%   BMI 19.26 kg/m   Estimated body mass index is 19.26 kg/m  as calculated from the following:    Height as of this encounter: 1.588 m (5' 2.5\").    Weight as of this encounter: 48.5 kg (107 lb).    Patient presents to the clinic using No DME    Health Maintenance that is potentially due pending provider review:  PHQ9    Pt completing PHQ9 today.    Is there anyone who you would like to be able to receive your results? No  If yes have patient fill out YOBANY      "

## 2019-08-05 NOTE — PROGRESS NOTES
Subjective     Lauren Tam is a 59 year old female who presents to clinic today for the following health issues:    HPI   Sinus problem       Duration: 2 weeks     Description (location/character/radiation): Sinus problem     Intensity:  moderate, severe    Accompanying signs and symptoms: ears are plugged, cough, facial pain, sinus pressure, headache, congestion, fatigue, nausea, no fever     History (similar episodes/previous evaluation): None    Precipitating or alleviating factors: None    Therapies tried and outcome: salt water through the nasal passages, advil sinus medicine          Patient Active Problem List   Diagnosis     CARDIOVASCULAR SCREENING; LDL GOAL LESS THAN 160     S/P LEEP of cervix-CIN2     Advanced directives, counseling/discussion     Severe episode of recurrent major depressive disorder, without psychotic features (H)     ASCUS with positive high risk human papillomavirus of vagina     S/P complete hysterectomy     Past Surgical History:   Procedure Laterality Date     COLONOSCOPY  3/2/2011    COLONOSCOPY performed by CESAR GONZALEZ at [facility]     CYSTOSCOPY N/A 10/12/2015    Procedure: CYSTOSCOPY;  Surgeon: Hunter Cruz MD;  Location: [facility]     INTERPOSITION TENDON HAND Left 12/5/2017    Procedure: INTERPOSITION TENDON HAND;  Left thumb ligament reconstruction tendon interposition with hemitrapezoidectomy and thumb metacarpophalangeal joint volar capsulodesis;  Surgeon: Eric Berry MD;  Location: [facility]     LAPAROSCOPIC HYSTERECTOMY TOTAL, BILATERAL SALPINGO-OOPHORECTOMY, COMBINED N/A 10/12/2015    Procedure: COMBINED LAPAROSCOPIC HYSTERECTOMY TOTAL, SALPINGO-OOPHORECTOMY;  Surgeon: Hunter Cruz MD;  Location: [facility]     LEEP TX, CERVICAL  6/23/15    ELAINA 1/2 with indeterminate margin     SURGICAL HISTORY OF -   1967    T&A     SURGICAL HISTORY OF -       PE Tubes x6 starting 1967     SURGICAL HISTORY OF -   1979    Appy     SURGICAL HISTORY OF -        D&C     SURGICAL HISTORY OF -       Tubal Ligation     SURGICAL HISTORY OF -       Arthroscopy (R) Knee       Social History     Tobacco Use     Smoking status: Former Smoker     Years: 6.00     Types: Cigarettes     Last attempt to quit: 1984     Years since quittin.6     Smokeless tobacco: Never Used     Tobacco comment: 23 years ago quit date   Substance Use Topics     Alcohol use: Yes     Comment: 3 drinks a weekly     Family History   Problem Relation Age of Onset     Prostate Cancer Father      Aneurysm Father         addominal     Heart Disease Paternal Grandmother      Heart Failure Paternal Grandmother      Cerebrovascular Disease Paternal Grandfather      Family History Negative Mother      Liver Disease Maternal Grandfather      Heart Disease Brother      Heart Disease Sister      Heart Disease Son      Heart Disease Daughter      Heart Disease Brother      Heart Disease Sister      Heart Disease Sister      Heart Disease Son          Current Outpatient Medications   Medication Sig Dispense Refill     amoxicillin-clavulanate (AUGMENTIN) 875-125 MG tablet Take 1 tablet by mouth 2 times daily for 10 days 20 tablet 0     Aspirin-Acetaminophen-Caffeine (EXCEDRIN PO) Take by mouth as needed        fluconazole (DIFLUCAN) 150 MG tablet Take 1 tablet by mouth x 1; May repeat in 1 week if still symptomatic 2 tablet 0     fluticasone (FLONASE) 50 MCG/ACT nasal spray Spray 1 spray into both nostrils 2 times daily 18.2 mL 3     fluticasone (FLONASE) 50 MCG/ACT nasal spray Spray 1-2 sprays into both nostrils daily 1 g 0     GLUCOSAMINE-CHONDROITIN PO Take 2 tablets by mouth daily       hydrOXYzine (ATARAX) 25 MG tablet Take 1 tablet (25 mg) by mouth every 6 hours as needed for itching (and nausea) 30 tablet 0     MULTIVITAMIN TABS   OR 1 TABLET DAILY       naproxen (NAPROSYN) 500 MG tablet Take 1 tablet (500 mg) by mouth 2 times daily as needed for moderate pain       acetaminophen (TYLENOL) 325  "MG tablet Take 3 tablets (975 mg) by mouth every 8 hours (Patient not taking: Reported on 8/5/2019) 100 tablet 0     Allergies   Allergen Reactions     Nkda [No Known Drug Allergies]          Reviewed and updated as needed this visit by Provider  Tobacco  Allergies  Meds  Problems  Med Hx  Surg Hx  Fam Hx       Review of Systems   Constitutional: Positive for malaise/fatigue. Negative for chills and fever.   HENT: Positive for congestion and sinus pain. Negative for ear pain and sore throat.    Eyes: Negative for blurred vision, discharge and redness.   Respiratory: Negative for cough, shortness of breath and wheezing.    Cardiovascular: Negative for chest pain and palpitations.   Gastrointestinal: Negative for abdominal pain, diarrhea, nausea and vomiting.   Musculoskeletal: Negative for joint pain and myalgias.   Skin: Negative for rash.   Neurological: Positive for headaches.           Objective    /87   Pulse 77   Temp 97.5  F (36.4  C) (Tympanic)   Resp 16   Ht 1.588 m (5' 2.5\")   Wt 48.5 kg (107 lb)   LMP 03/17/2008   SpO2 100%   BMI 19.26 kg/m    Body mass index is 19.26 kg/m .    Physical Exam   Constitutional: She appears well-developed and well-nourished.   HENT:   Head: Normocephalic.   Right Ear: Tympanic membrane and ear canal normal.   Left Ear: Tympanic membrane and ear canal normal.   Mouth/Throat: Oropharynx is clear and moist.   Eyes: Pupils are equal, round, and reactive to light. Conjunctivae are normal.   Cardiovascular: Normal rate and normal heart sounds.   Pulmonary/Chest: Effort normal and breath sounds normal.   Skin: Skin is warm and dry. No rash noted.   Psychiatric: She has a normal mood and affect. Her behavior is normal.         Diagnostic Test Results:  none         Assessment & Plan     1. Acute sinusitis with symptoms > 10 days  Will treat with Augmentin twice daily x 10 days and flonase two times daily . Get plenty of rest and push fluids. Continue with " supportive care. Follow up as needed.     - amoxicillin-clavulanate (AUGMENTIN) 875-125 MG tablet; Take 1 tablet by mouth 2 times daily for 10 days  Dispense: 20 tablet; Refill: 0  - fluticasone (FLONASE) 50 MCG/ACT nasal spray; Spray 1 spray into both nostrils 2 times daily  Dispense: 18.2 mL; Refill: 3    2. Antibiotic-induced yeast infection  Prescribed fluconazole that she can take if needed.     - fluconazole (DIFLUCAN) 150 MG tablet; Take 1 tablet by mouth x 1; May repeat in 1 week if still symptomatic  Dispense: 2 tablet; Refill: 0       Shakira Albrecht PA-C  [facility]

## 2019-09-18 ENCOUNTER — TRANSFERRED RECORDS (OUTPATIENT)
Dept: HEALTH INFORMATION MANAGEMENT | Facility: CLINIC | Age: 59
End: 2019-09-18

## 2019-10-04 ENCOUNTER — TRANSFERRED RECORDS (OUTPATIENT)
Dept: HEALTH INFORMATION MANAGEMENT | Facility: CLINIC | Age: 59
End: 2019-10-04

## 2019-11-02 ENCOUNTER — HEALTH MAINTENANCE LETTER (OUTPATIENT)
Age: 59
End: 2019-11-02

## 2019-11-14 ENCOUNTER — OFFICE VISIT (OUTPATIENT)
Dept: FAMILY MEDICINE | Facility: CLINIC | Age: 59
End: 2019-11-14
Payer: COMMERCIAL

## 2019-11-14 VITALS
HEART RATE: 80 BPM | WEIGHT: 116 LBS | HEIGHT: 63 IN | BODY MASS INDEX: 20.55 KG/M2 | TEMPERATURE: 98.4 F | SYSTOLIC BLOOD PRESSURE: 120 MMHG | RESPIRATION RATE: 14 BRPM | DIASTOLIC BLOOD PRESSURE: 80 MMHG

## 2019-11-14 DIAGNOSIS — M18.11 OSTEOARTHRITIS OF THUMB, RIGHT: ICD-10-CM

## 2019-11-14 DIAGNOSIS — G56.01 CARPAL TUNNEL SYNDROME OF RIGHT WRIST: ICD-10-CM

## 2019-11-14 DIAGNOSIS — Z01.818 PREOP GENERAL PHYSICAL EXAM: Primary | ICD-10-CM

## 2019-11-14 PROCEDURE — 90714 TD VACC NO PRESV 7 YRS+ IM: CPT | Performed by: FAMILY MEDICINE

## 2019-11-14 PROCEDURE — 99214 OFFICE O/P EST MOD 30 MIN: CPT | Mod: 25 | Performed by: FAMILY MEDICINE

## 2019-11-14 PROCEDURE — 90471 IMMUNIZATION ADMIN: CPT | Performed by: FAMILY MEDICINE

## 2019-11-14 ASSESSMENT — MIFFLIN-ST. JEOR: SCORE: 1062.36

## 2019-11-14 NOTE — PROGRESS NOTES
[facility]  [address] [place] [id number]  [phone]  Dept: [phone]    PRE-OP EVALUATION:  Today's date: 2019    Lauren Tam (: 1960) presents for pre-operative evaluation assessment as requested by Dr. Berry.  She requires evaluation and anesthesia risk assessment prior to undergoing surgery/procedure for treatment of Carpal Tunnel and Thumb Ligament Reconstruction Tendon Interposition  Fax number for surgical facility:   Primary Physician: Fausto Gregg  Type of Anesthesia Anticipated: Combined General with Infraclavicular Block  Patient has a Health Care Directive or Living Will:  NO    Preop Questions 2019   Who is doing your surgery? Dr Berry   What are you having done? Carpal tunnel and reconstruction of thumb on right hand.   Date of Surgery/Procedure: 2019   Facility or Hospital where procedure/surgery will be performed: [facility]   1.  Do you have a history of Heart attack, stroke, stent, coronary bypass surgery, or other heart surgery? No   2.  Do you ever have any pain or discomfort in your chest? No   3.  Do you have a history of  Heart Failure? No   4.   Are you troubled by shortness of breath when:  walking on a level surface, or up a slight hill, or at night? No   5.  Do you currently have a cold, bronchitis or other respiratory infection? No   6.  Do you have a cough, shortness of breath, or wheezing? No   7.  Do you sometimes get pains in the calves of your legs when you walk? No   8. Do you or anyone in your family have previous history of blood clots? No   9.  Do you or does anyone in your family have a serious bleeding problem such as prolonged bleeding following surgeries or cuts? No   10. Have you ever had problems with anemia or been told to take iron pills? YES - she has had anemia in the past related to heavy menses.    11. Have you had any abnormal blood loss such as black, tarry or  bloody stools, or abnormal vaginal bleeding? No   12. Have you ever had a blood transfusion? No   13. Have you or any of your relatives ever had problems with anesthesia? No   14. Do you have sleep apnea, excessive snoring or daytime drowsiness? No   15. Do you have any prosthetic heart valves? No   16. Do you have prosthetic joints? No   17. Is there any chance that you may be pregnant? No           HPI:     She has had numbness right hand going up to shoulder.  Pain around the base of her right thumb.  She has had similar surgery on her left hand in the past.   She has had EMG.  She has been seeing Dr. Berry who plans to do right carpal tunnel surgery and reconstruction of her right thumb.     MEDICAL HISTORY:     Patient Active Problem List    Diagnosis Date Noted     ASCUS with positive high risk human papillomavirus of vagina 01/04/2017     Priority: Medium     S/P complete hysterectomy 01/04/2017     Priority: Medium     Severe episode of recurrent major depressive disorder, without psychotic features (H) 09/12/2016     Priority: Medium     S/P LEEP of cervix-CIN2 06/23/2015     Priority: Medium     1/9/2015:Pap--NIL, +HR HPV type 18 with an additional HR HPV type identified as well. Plan [place]-  4/22/15: Pt noncompliant. Moran not done. Reminder placed for 6 month pap.   6/5/15:Moran Bx - Negative, ECC - suspicious for HSIL. Pap--ASCUS, +HR HPV type 18 & other HR HPV type(s) identified. Plan colp/possible LEEP with GYN  6/23/15: LEEP - ELAINA 2, indeterminate endocervical extension. Plan cytology and repeat ECC in 4-6 months.   10/12/15: Hysterectomy - negative. Repeat pap in 1 year.   12/2/16: ASCUS/+ HR HPV (not 16 or 18): Plan: plan colp within 3 months.   1/4/17: Moran - visually normal; no biopsies taken. Plan cotest in 1 year.   11/14/17 Pap: NIL/neg HPV. Plan Pap+HPV in 1 year.  12/6/18 Lost to follow-up for pap tracking  01/28/19 Vag pap = NIL, Neg HPV plan pap in 3 years. Screening x 20 years post LEEP.           CARDIOVASCULAR SCREENING; LDL GOAL LESS THAN 160 10/31/2010     Priority: Medium     Advanced directives, counseling/discussion 09/25/2015     Priority: Low     Information given to patient        Past Medical History:   Diagnosis Date     ASCUS with positive high risk HPV cervical 12/2/16     Cervical high risk HPV (human papillomavirus) test positive 1/16/2015 1/9/2015:Pap--NIL, +HR HPV type 18 with an additional HR HPV type identified as well. Plan [place]-      Excessive or frequent menstruation      H/O colposcopy with cervical biopsy 6/5/2015    Bx - benign, ECC - suspicious for HSIL     Past Surgical History:   Procedure Laterality Date     COLONOSCOPY  3/2/2011    COLONOSCOPY performed by CESAR GONZALEZ at [facility]     CYSTOSCOPY N/A 10/12/2015    Procedure: CYSTOSCOPY;  Surgeon: Hunter Cruz MD;  Location: [facility]     INTERPOSITION TENDON HAND Left 12/5/2017    Procedure: INTERPOSITION TENDON HAND;  Left thumb ligament reconstruction tendon interposition with hemitrapezoidectomy and thumb metacarpophalangeal joint volar capsulodesis;  Surgeon: Eric Berry MD;  Location: [facility]     LAPAROSCOPIC HYSTERECTOMY TOTAL, BILATERAL SALPINGO-OOPHORECTOMY, COMBINED N/A 10/12/2015    Procedure: COMBINED LAPAROSCOPIC HYSTERECTOMY TOTAL, SALPINGO-OOPHORECTOMY;  Surgeon: Hunter Cruz MD;  Location: [facility]     LEEP TX, CERVICAL  6/23/15    ELAINA 1/2 with indeterminate margin     SURGICAL HISTORY OF -   1967    T&A     SURGICAL HISTORY OF -       PE Tubes x6 starting 1967     SURGICAL HISTORY OF -   1979    Appy     SURGICAL HISTORY OF -       D&C     SURGICAL HISTORY OF -   1984    Tubal Ligation     SURGICAL HISTORY OF -   1987    Arthroscopy (R) Knee     Current Outpatient Medications   Medication Sig Dispense Refill     acetaminophen (TYLENOL) 325 MG tablet Take 3 tablets (975 mg) by mouth every 8 hours 100 tablet 0     Aspirin-Acetaminophen-Caffeine (EXCEDRIN PO) Take by mouth  "as needed        fluticasone (FLONASE) 50 MCG/ACT nasal spray Spray 1 spray into both nostrils 2 times daily 18.2 mL 3     GLUCOSAMINE-CHONDROITIN PO Take 2 tablets by mouth daily       MULTIVITAMIN TABS   OR 1 TABLET DAILY       OTC products: None, except as noted above    Allergies   Allergen Reactions     Nkda [No Known Drug Allergies]       Latex Allergy: NO    Social History     Tobacco Use     Smoking status: Former Smoker     Years: 6.00     Types: Cigarettes     Last attempt to quit: 1984     Years since quittin.8     Smokeless tobacco: Never Used     Tobacco comment: 23 years ago quit date   Substance Use Topics     Alcohol use: Yes     Comment: 3 drinks a weekly     History   Drug Use No     Family History :  ============  No family history of bleeding or anesthesia problems.     REVIEW OF SYSTEMS:   ROS:  General: No change in weight, sleep or appetite.  Normal energy.  No fever or chills  Eyes: Negative for vision changes or eye problems  ENT: No problems with ears, nose or throat.  No difficulty swallowing.  Resp: No coughing, wheezing or shortness of breath  CV: No chest pains or palpitations  GI: No nausea, vomiting,  heartburn, abdominal pain, diarrhea, constipation or change in bowel habits  : No urinary frequency or dysuria, bladder or kidney problems  Musculoskeletal: POSITIVE  for:, pain right hand .  Some right shoulder pain-seeing chiropractor.  Has had accupuncture.   Neurologic: No headaches, numbness, tingling, weakness, problems with balance or coordination  Psychiatric: No problems with anxiety, depression or mental health  Heme/immune/allergy: POSITIVE  for:, anemia in the past.   Endocrine: No history of thyroid disease, diabetes or other endocrine disorders  Skin: No rashes,worrisome lesions or skin problems     EXAM:   OBJECTIVE:Blood pressure 120/80, pulse 80, temperature 98.4  F (36.9  C), temperature source Tympanic, resp. rate 14, height 1.588 m (5' 2.5\"), weight 52.6 kg " (116 lb), last menstrual period 03/17/2008, not currently breastfeeding. BMI=Body mass index is 20.88 kg/m .  GENERAL APPEARANCE ADULT: Alert, no acute distress  EYES: PERRL, EOM normal, conjunctiva and lids normal  HENT: Ears and TMs normal, oral mucosa and posterior oropharynx normal, edentulous  with denture plates  NECK: No adenopathy,masses or thyromegaly  RESP: lungs clear to auscultation   CV: normal rate, regular rhythm, no murmur or gallop  ABDOMEN: soft, no organomegaly, masses or tenderness.  She has a 5 cm mass upper abdomen left of midline probable lipoma.  She reports it has not changed in the past 9 years and asymptomatic.  MS: extremities normal, no peripheral edema     DIAGNOSTICS:   No labs or EKG required for low risk surgery (cataract, skin procedure, breast biopsy, etc)    Recent Labs   Lab Test 01/17/17  1144 08/24/16  1415 07/26/16 2000   HGB 14.9  --  12.4     --  219    139 146*   POTASSIUM 4.4 3.8 3.4   CR 0.84 0.76 0.84        IMPRESSION:   Reason for surgery/procedure: right hand pain and numbness.     The proposed surgical procedure is considered INTERMEDIATE risk.    REVISED CARDIAC RISK INDEX  The patient has the following serious cardiovascular risks for perioperative complications such as (MI, PE, VFib and 3  AV Block):  No serious cardiac risks  INTERPRETATION: 0 risks: Class I (very low risk - 0.4% complication rate)    The patient has the following additional risks for perioperative complications:  No identified additional risks      ICD-10-CM    1. Preop general physical exam Z01.818    2. Carpal tunnel syndrome of right wrist G56.01    3. Osteoarthritis of thumb, right M18.11        RECOMMENDATIONS:       APPROVAL GIVEN to proceed with proposed procedure, without further diagnostic evaluation       Signed Electronically by: Fausto Gregg MD    Copy of this evaluation report is provided to requesting physician.    [facility] Preop Guidelines    Revised Cardiac  Risk Index

## 2019-11-18 ENCOUNTER — ANESTHESIA EVENT (OUTPATIENT)
Dept: SURGERY | Facility: CLINIC | Age: 59
End: 2019-11-18
Payer: COMMERCIAL

## 2019-11-18 ASSESSMENT — LIFESTYLE VARIABLES: TOBACCO_USE: 1

## 2019-11-18 NOTE — ANESTHESIA PREPROCEDURE EVALUATION
Anesthesia Pre-Procedure Evaluation    Patient: Lauren Tam   MRN: [id number] : 1960          Preoperative Diagnosis: Right carpal tunnel syndrome [G56.01]  Arthritis [M19.90]    Procedure(s):  Endoscopic Carpal Tunnel Release  Thumb Ligament Reconstruction Tendon Interposition    Past Medical History:   Diagnosis Date     ASCUS with positive high risk HPV cervical 16     Cervical high risk HPV (human papillomavirus) test positive 2015:Pap--NIL, +HR HPV type 18 with an additional HR HPV type identified as well. Plan [place]-      Excessive or frequent menstruation      H/O colposcopy with cervical biopsy 2015    Bx - benign, ECC - suspicious for HSIL     Past Surgical History:   Procedure Laterality Date     COLONOSCOPY  3/2/2011    COLONOSCOPY performed by CESAR GONZALEZ at [facility]     CYSTOSCOPY N/A 10/12/2015    Procedure: CYSTOSCOPY;  Surgeon: Hunter Cruz MD;  Location: [facility]     INTERPOSITION TENDON HAND Left 2017    Procedure: INTERPOSITION TENDON HAND;  Left thumb ligament reconstruction tendon interposition with hemitrapezoidectomy and thumb metacarpophalangeal joint volar capsulodesis;  Surgeon: Eric Berry MD;  Location: [facility]     LAPAROSCOPIC HYSTERECTOMY TOTAL, BILATERAL SALPINGO-OOPHORECTOMY, COMBINED N/A 10/12/2015    Procedure: COMBINED LAPAROSCOPIC HYSTERECTOMY TOTAL, SALPINGO-OOPHORECTOMY;  Surgeon: Hunter Cruz MD;  Location: [facility]     LEEP TX, CERVICAL  6/23/15    ELAINA 1/2 with indeterminate margin     SURGICAL HISTORY OF -       T&A     SURGICAL HISTORY OF -       PE Tubes x6 starting      SURGICAL HISTORY OF -       Appy     SURGICAL HISTORY OF -       D&C     SURGICAL HISTORY OF -       Tubal Ligation     SURGICAL HISTORY OF -       Arthroscopy (R) Knee       Anesthesia Evaluation     . Pt has had prior anesthetic. Type: General and MAC           ROS/MED HX    ENT/Pulmonary:     (+)tobacco use,  Past use , . .    Neurologic:     (+)other neuro numbness and tingling in right hand    Cardiovascular:     (+) Dyslipidemia, ----. : . . . :. . Previous cardiac testing Echodate:9/12/16results:Interpretation Summary     The left ventricle is normal in size.  There is normal left ventricular wall thickness.  Left ventricular systolic function is normal.  The visual ejection fraction is estimated at 60-65%.  Normal left ventricular wall motion  Mild mitral valve prolapse, posterior leaflet  There is mild (1+) mitral regurgitation.  There is mild (1+) tricuspid regurgitation.  There is no comparison study available.  ______________________________________________________________________________           Left Ventricle  The left ventricle is normal in size. There is normal left ventricular wall  thickness. Left ventricular systolic function is normal. The visual ejection  fraction is estimated at 60-65%. Grade I or early diastolic dysfunction.  Normal left ventricular wall motion.     Right Ventricle  The right ventricle is normal in structure, function and size.  Atria  Normal left atrial size. Right atrial size is normal. There is no atrial  shunt seen.     Mitral Valve  Mild mitral valve prolapse, posterior leaflet. There is mild (1+) mitral  regurgitation.     Tricuspid Valve  There is mild (1+) tricuspid regurgitation. Right ventricular systolic  pressure could not be approximated due to inadequate tricuspid regurgitation.     Aortic Valve  The aortic valve is normal in structure and function. No aortic regurgitation  is present. No aortic stenosis is present.     Pulmonic Valve  The pulmonic valve is not well seen, but is grossly normal. There is trace  pulmonic valvular regurgitation.     Vessels  The aortic root is normal size. The IVC is normal in size and reactivity with  respiration, suggesting normal central venous pressure.  Pericardium  There is no pericardial effusion.     Rhythm  The rhythm was normal  "sinus.date: results:ECG reviewed date:9/6/16 results:Sinus Bradycardia   Possible left ventricular hypertrophy on non-voltage basis.    -Nonspecific ST depression -Seen with left ventricular hypertrophy (strain) or digitalis effect.     ABNORMAL date: results:          METS/Exercise Tolerance:  >4 METS   Hematologic:  - neg hematologic  ROS       Musculoskeletal:  - neg musculoskeletal ROS       GI/Hepatic:  - neg GI/hepatic ROS       Renal/Genitourinary:  - ROS Renal section negative       Endo:  - neg endo ROS       Psychiatric:     (+) psychiatric history depression      Infectious Disease:  - neg infectious disease ROS       Malignancy:      - no malignancy   Other:    - neg other ROS                      Physical Exam  Normal systems: cardiovascular and pulmonary    Airway   Mallampati: II  TM distance: >3 FB    Dental     Cardiovascular       Pulmonary             Lab Results   Component Value Date    WBC 6.8 01/17/2017    HGB 14.9 01/17/2017    HCT 45.2 01/17/2017     01/17/2017     01/17/2017    POTASSIUM 4.4 01/17/2017    CHLORIDE 104 01/17/2017    CO2 30 01/17/2017    BUN 14 01/17/2017    CR 0.84 01/17/2017    GLC 98 01/17/2017    JAYJAY 8.7 01/17/2017    MAG 1.9 07/26/2016    ALBUMIN 3.6 01/17/2017    PROTTOTAL 6.6 (L) 01/17/2017    ALT 16 01/17/2017    AST 17 01/17/2017    ALKPHOS 61 01/17/2017    BILITOTAL 0.5 01/17/2017    TSH 1.10 08/24/2016    HCG Negative 06/01/2004       Preop Vitals  BP Readings from Last 3 Encounters:   11/14/19 120/80   08/05/19 132/87   01/28/19 132/60    Pulse Readings from Last 3 Encounters:   11/14/19 80   08/05/19 77   01/28/19 88      Resp Readings from Last 3 Encounters:   11/14/19 14   08/05/19 16   12/22/18 14    SpO2 Readings from Last 3 Encounters:   08/05/19 100%   03/14/18 97%   12/05/17 96%      Temp Readings from Last 1 Encounters:   11/14/19 36.9  C (98.4  F) (Tympanic)    Ht Readings from Last 1 Encounters:   11/14/19 1.588 m (5' 2.5\")      Wt Readings " "from Last 1 Encounters:   11/14/19 52.6 kg (116 lb)    Estimated body mass index is 20.88 kg/m  as calculated from the following:    Height as of 11/14/19: 1.588 m (5' 2.5\").    Weight as of 11/14/19: 52.6 kg (116 lb).       Anesthesia Plan      History & Physical Review  History and physical reviewed and following examination; no interval change.    ASA Status:  2 .    NPO Status:  > 6 hours    Plan for General, LMA and Peripheral Nerve Block with Intravenous and Propofol induction. Maintenance will be Balanced.    PONV prophylaxis:  Ondansetron (or other 5HT-3) and Dexamethasone or Solumedrol       Postoperative Care  Postoperative pain management:  Oral pain medications, IV analgesics and Peripheral nerve block (Single Shot).      Consents  Anesthetic plan, risks, benefits and alternatives discussed with:  Patient..                 DAVID García CRNA  "

## 2019-11-19 ENCOUNTER — HOSPITAL ENCOUNTER (OUTPATIENT)
Facility: CLINIC | Age: 59
Discharge: HOME | End: 2019-11-19
Attending: ORTHOPAEDIC SURGERY | Admitting: ORTHOPAEDIC SURGERY
Payer: COMMERCIAL

## 2019-11-19 ENCOUNTER — ANESTHESIA (OUTPATIENT)
Dept: SURGERY | Facility: CLINIC | Age: 59
End: 2019-11-19
Payer: COMMERCIAL

## 2019-11-19 VITALS
OXYGEN SATURATION: 93 % | DIASTOLIC BLOOD PRESSURE: 70 MMHG | TEMPERATURE: 97.4 F | WEIGHT: 116 LBS | BODY MASS INDEX: 20.55 KG/M2 | HEIGHT: 63 IN | RESPIRATION RATE: 16 BRPM | SYSTOLIC BLOOD PRESSURE: 115 MMHG | HEART RATE: 59 BPM

## 2019-11-19 DIAGNOSIS — M18.11 ARTHRITIS OF CARPOMETACARPAL (CMC) JOINT OF RIGHT THUMB: Primary | ICD-10-CM

## 2019-11-19 PROCEDURE — 71000012 ZZH RECOVERY PHASE 1 LEVEL 1 FIRST HR: Performed by: ORTHOPAEDIC SURGERY

## 2019-11-19 PROCEDURE — 40000305 ZZH STATISTIC PRE PROC ASSESS I: Performed by: ORTHOPAEDIC SURGERY

## 2019-11-19 PROCEDURE — 25000125 ZZHC RX 250: Performed by: NURSE ANESTHETIST, CERTIFIED REGISTERED

## 2019-11-19 PROCEDURE — 71000013 ZZH RECOVERY PHASE 1 LEVEL 1 EA ADDTL HR: Performed by: ORTHOPAEDIC SURGERY

## 2019-11-19 PROCEDURE — 25000128 H RX IP 250 OP 636: Performed by: PHYSICIAN ASSISTANT

## 2019-11-19 PROCEDURE — 88305 TISSUE EXAM BY PATHOLOGIST: CPT | Performed by: ORTHOPAEDIC SURGERY

## 2019-11-19 PROCEDURE — 25000132 ZZH RX MED GY IP 250 OP 250 PS 637: Performed by: PHYSICIAN ASSISTANT

## 2019-11-19 PROCEDURE — 27210794 ZZH OR GENERAL SUPPLY STERILE: Performed by: ORTHOPAEDIC SURGERY

## 2019-11-19 PROCEDURE — 37000009 ZZH ANESTHESIA TECHNICAL FEE, EACH ADDTL 15 MIN: Performed by: ORTHOPAEDIC SURGERY

## 2019-11-19 PROCEDURE — C1713 ANCHOR/SCREW BN/BN,TIS/BN: HCPCS | Performed by: ORTHOPAEDIC SURGERY

## 2019-11-19 PROCEDURE — 88305 TISSUE EXAM BY PATHOLOGIST: CPT | Mod: 26 | Performed by: ORTHOPAEDIC SURGERY

## 2019-11-19 PROCEDURE — 71000027 ZZH RECOVERY PHASE 2 EACH 15 MINS: Performed by: ORTHOPAEDIC SURGERY

## 2019-11-19 PROCEDURE — 25000128 H RX IP 250 OP 636: Performed by: NURSE ANESTHETIST, CERTIFIED REGISTERED

## 2019-11-19 PROCEDURE — 37000008 ZZH ANESTHESIA TECHNICAL FEE, 1ST 30 MIN: Performed by: ORTHOPAEDIC SURGERY

## 2019-11-19 PROCEDURE — 25800030 ZZH RX IP 258 OP 636: Performed by: NURSE ANESTHETIST, CERTIFIED REGISTERED

## 2019-11-19 PROCEDURE — 36000056 ZZH SURGERY LEVEL 3 1ST 30 MIN: Performed by: ORTHOPAEDIC SURGERY

## 2019-11-19 PROCEDURE — 36000058 ZZH SURGERY LEVEL 3 EA 15 ADDTL MIN: Performed by: ORTHOPAEDIC SURGERY

## 2019-11-19 DEVICE — IMP SCR ARTHREX BIO-TENODESIS BIOCOMP 4X10MM AR-1540BC: Type: IMPLANTABLE DEVICE | Site: THUMB | Status: FUNCTIONAL

## 2019-11-19 RX ORDER — FENTANYL CITRATE 50 UG/ML
25-50 INJECTION, SOLUTION INTRAMUSCULAR; INTRAVENOUS
Status: CANCELLED | OUTPATIENT
Start: 2019-11-19

## 2019-11-19 RX ORDER — GABAPENTIN 300 MG/1
300 CAPSULE ORAL
Status: COMPLETED | OUTPATIENT
Start: 2019-11-19 | End: 2019-11-19

## 2019-11-19 RX ORDER — LIDOCAINE HYDROCHLORIDE AND EPINEPHRINE BITARTRATE 20; .01 MG/ML; MG/ML
INJECTION, SOLUTION SUBCUTANEOUS PRN
Status: DISCONTINUED | OUTPATIENT
Start: 2019-11-19 | End: 2019-11-19

## 2019-11-19 RX ORDER — MEPERIDINE HYDROCHLORIDE 25 MG/ML
12.5 INJECTION INTRAMUSCULAR; INTRAVENOUS; SUBCUTANEOUS
Status: DISCONTINUED | OUTPATIENT
Start: 2019-11-19 | End: 2019-11-19 | Stop reason: HOSPADM

## 2019-11-19 RX ORDER — HYDRALAZINE HYDROCHLORIDE 20 MG/ML
2.5-5 INJECTION INTRAMUSCULAR; INTRAVENOUS EVERY 10 MIN PRN
Status: DISCONTINUED | OUTPATIENT
Start: 2019-11-19 | End: 2019-11-19 | Stop reason: HOSPADM

## 2019-11-19 RX ORDER — SODIUM CHLORIDE, SODIUM LACTATE, POTASSIUM CHLORIDE, CALCIUM CHLORIDE 600; 310; 30; 20 MG/100ML; MG/100ML; MG/100ML; MG/100ML
INJECTION, SOLUTION INTRAVENOUS CONTINUOUS
Status: DISCONTINUED | OUTPATIENT
Start: 2019-11-19 | End: 2019-11-19 | Stop reason: HOSPADM

## 2019-11-19 RX ORDER — KETOROLAC TROMETHAMINE 30 MG/ML
30 INJECTION, SOLUTION INTRAMUSCULAR; INTRAVENOUS EVERY 6 HOURS PRN
Status: DISCONTINUED | OUTPATIENT
Start: 2019-11-19 | End: 2019-11-19 | Stop reason: HOSPADM

## 2019-11-19 RX ORDER — ACETAMINOPHEN 325 MG/1
975 TABLET ORAL ONCE
Status: COMPLETED | OUTPATIENT
Start: 2019-11-19 | End: 2019-11-19

## 2019-11-19 RX ORDER — AMOXICILLIN 250 MG
1-2 CAPSULE ORAL 2 TIMES DAILY
Qty: 25 TABLET | Refills: 0 | Status: SHIPPED | OUTPATIENT
Start: 2019-11-19

## 2019-11-19 RX ORDER — ALBUTEROL SULFATE 0.83 MG/ML
2.5 SOLUTION RESPIRATORY (INHALATION) EVERY 4 HOURS PRN
Status: DISCONTINUED | OUTPATIENT
Start: 2019-11-19 | End: 2019-11-19 | Stop reason: HOSPADM

## 2019-11-19 RX ORDER — HYDROCODONE BITARTRATE AND ACETAMINOPHEN 5; 325 MG/1; MG/1
1 TABLET ORAL
Status: DISCONTINUED | OUTPATIENT
Start: 2019-11-19 | End: 2019-11-19 | Stop reason: HOSPADM

## 2019-11-19 RX ORDER — PROPOFOL 10 MG/ML
INJECTION, EMULSION INTRAVENOUS CONTINUOUS PRN
Status: DISCONTINUED | OUTPATIENT
Start: 2019-11-19 | End: 2019-11-19

## 2019-11-19 RX ORDER — ONDANSETRON 4 MG/1
4 TABLET, ORALLY DISINTEGRATING ORAL
Status: DISCONTINUED | OUTPATIENT
Start: 2019-11-19 | End: 2019-11-19 | Stop reason: HOSPADM

## 2019-11-19 RX ORDER — ONDANSETRON 2 MG/ML
INJECTION INTRAMUSCULAR; INTRAVENOUS PRN
Status: DISCONTINUED | OUTPATIENT
Start: 2019-11-19 | End: 2019-11-19

## 2019-11-19 RX ORDER — CEFAZOLIN SODIUM 2 G/100ML
2 INJECTION, SOLUTION INTRAVENOUS
Status: COMPLETED | OUTPATIENT
Start: 2019-11-19 | End: 2019-11-19

## 2019-11-19 RX ORDER — PROPOFOL 10 MG/ML
INJECTION, EMULSION INTRAVENOUS PRN
Status: DISCONTINUED | OUTPATIENT
Start: 2019-11-19 | End: 2019-11-19

## 2019-11-19 RX ORDER — DEXAMETHASONE SODIUM PHOSPHATE 4 MG/ML
4 INJECTION, SOLUTION INTRA-ARTICULAR; INTRALESIONAL; INTRAMUSCULAR; INTRAVENOUS; SOFT TISSUE EVERY 10 MIN PRN
Status: DISCONTINUED | OUTPATIENT
Start: 2019-11-19 | End: 2019-11-19 | Stop reason: HOSPADM

## 2019-11-19 RX ORDER — NALOXONE HYDROCHLORIDE 0.4 MG/ML
.1-.4 INJECTION, SOLUTION INTRAMUSCULAR; INTRAVENOUS; SUBCUTANEOUS
Status: DISCONTINUED | OUTPATIENT
Start: 2019-11-19 | End: 2019-11-19 | Stop reason: HOSPADM

## 2019-11-19 RX ORDER — ONDANSETRON 4 MG/1
4 TABLET, ORALLY DISINTEGRATING ORAL EVERY 30 MIN PRN
Status: DISCONTINUED | OUTPATIENT
Start: 2019-11-19 | End: 2019-11-19 | Stop reason: HOSPADM

## 2019-11-19 RX ORDER — KETOROLAC TROMETHAMINE 30 MG/ML
INJECTION, SOLUTION INTRAMUSCULAR; INTRAVENOUS PRN
Status: DISCONTINUED | OUTPATIENT
Start: 2019-11-19 | End: 2019-11-19

## 2019-11-19 RX ORDER — ACETAMINOPHEN 325 MG/1
650 TABLET ORAL
Status: DISCONTINUED | OUTPATIENT
Start: 2019-11-19 | End: 2019-11-19 | Stop reason: HOSPADM

## 2019-11-19 RX ORDER — LIDOCAINE 40 MG/G
CREAM TOPICAL
Status: DISCONTINUED | OUTPATIENT
Start: 2019-11-19 | End: 2019-11-19 | Stop reason: HOSPADM

## 2019-11-19 RX ORDER — CEFAZOLIN SODIUM 1 G/50ML
1 INJECTION, SOLUTION INTRAVENOUS SEE ADMIN INSTRUCTIONS
Status: DISCONTINUED | OUTPATIENT
Start: 2019-11-19 | End: 2019-11-19 | Stop reason: HOSPADM

## 2019-11-19 RX ORDER — HYDROXYZINE HYDROCHLORIDE 25 MG/1
25 TABLET, FILM COATED ORAL
Status: DISCONTINUED | OUTPATIENT
Start: 2019-11-19 | End: 2019-11-19 | Stop reason: HOSPADM

## 2019-11-19 RX ORDER — HYDROXYZINE HYDROCHLORIDE 25 MG/1
25-50 TABLET, FILM COATED ORAL EVERY 6 HOURS PRN
Qty: 25 TABLET | Refills: 1
Start: 2019-11-19

## 2019-11-19 RX ORDER — FENTANYL CITRATE 50 UG/ML
25-50 INJECTION, SOLUTION INTRAMUSCULAR; INTRAVENOUS
Status: DISCONTINUED | OUTPATIENT
Start: 2019-11-19 | End: 2019-11-19 | Stop reason: HOSPADM

## 2019-11-19 RX ORDER — HYDROMORPHONE HYDROCHLORIDE 1 MG/ML
.3-.5 INJECTION, SOLUTION INTRAMUSCULAR; INTRAVENOUS; SUBCUTANEOUS EVERY 10 MIN PRN
Status: DISCONTINUED | OUTPATIENT
Start: 2019-11-19 | End: 2019-11-19 | Stop reason: HOSPADM

## 2019-11-19 RX ORDER — ONDANSETRON 4 MG/1
4-8 TABLET, ORALLY DISINTEGRATING ORAL EVERY 8 HOURS PRN
Qty: 4 TABLET | Refills: 0 | Status: SHIPPED | OUTPATIENT
Start: 2019-11-19

## 2019-11-19 RX ORDER — ACETAMINOPHEN 325 MG/1
975 TABLET ORAL ONCE
Status: DISCONTINUED | OUTPATIENT
Start: 2019-11-19 | End: 2019-11-19

## 2019-11-19 RX ORDER — ONDANSETRON 2 MG/ML
4 INJECTION INTRAMUSCULAR; INTRAVENOUS EVERY 30 MIN PRN
Status: DISCONTINUED | OUTPATIENT
Start: 2019-11-19 | End: 2019-11-19 | Stop reason: HOSPADM

## 2019-11-19 RX ORDER — GABAPENTIN 300 MG/1
300 CAPSULE ORAL ONCE
Status: DISCONTINUED | OUTPATIENT
Start: 2019-11-19 | End: 2019-11-19

## 2019-11-19 RX ORDER — FENTANYL CITRATE 50 UG/ML
INJECTION, SOLUTION INTRAMUSCULAR; INTRAVENOUS PRN
Status: DISCONTINUED | OUTPATIENT
Start: 2019-11-19 | End: 2019-11-19

## 2019-11-19 RX ORDER — HYDROCODONE BITARTRATE AND ACETAMINOPHEN 5; 325 MG/1; MG/1
1-2 TABLET ORAL EVERY 4 HOURS PRN
Qty: 25 TABLET | Refills: 0
Start: 2019-11-19 | End: 2021-11-21

## 2019-11-19 RX ORDER — ROPIVACAINE HYDROCHLORIDE 7.5 MG/ML
INJECTION, SOLUTION EPIDURAL; PERINEURAL PRN
Status: DISCONTINUED | OUTPATIENT
Start: 2019-11-19 | End: 2019-11-19

## 2019-11-19 RX ADMIN — PROPOFOL 100 MCG/KG/MIN: 10 INJECTION, EMULSION INTRAVENOUS at 12:58

## 2019-11-19 RX ADMIN — ACETAMINOPHEN 975 MG: 325 TABLET, FILM COATED ORAL at 11:21

## 2019-11-19 RX ADMIN — SODIUM CHLORIDE, POTASSIUM CHLORIDE, SODIUM LACTATE AND CALCIUM CHLORIDE: 600; 310; 30; 20 INJECTION, SOLUTION INTRAVENOUS at 11:35

## 2019-11-19 RX ADMIN — LIDOCAINE HYDROCHLORIDE 0.3 ML: 10 INJECTION, SOLUTION EPIDURAL; INFILTRATION; INTRACAUDAL; PERINEURAL at 11:34

## 2019-11-19 RX ADMIN — ONDANSETRON 4 MG: 2 INJECTION INTRAMUSCULAR; INTRAVENOUS at 14:33

## 2019-11-19 RX ADMIN — GABAPENTIN 300 MG: 300 CAPSULE ORAL at 11:21

## 2019-11-19 RX ADMIN — FENTANYL CITRATE 50 MCG: 50 INJECTION, SOLUTION INTRAMUSCULAR; INTRAVENOUS at 13:00

## 2019-11-19 RX ADMIN — MIDAZOLAM 1 MG: 1 INJECTION INTRAMUSCULAR; INTRAVENOUS at 12:12

## 2019-11-19 RX ADMIN — PROPOFOL 80 MG: 10 INJECTION, EMULSION INTRAVENOUS at 13:00

## 2019-11-19 RX ADMIN — ROPIVACAINE HYDROCHLORIDE 20 ML: 7.5 INJECTION, SOLUTION EPIDURAL; PERINEURAL at 12:18

## 2019-11-19 RX ADMIN — KETOROLAC TROMETHAMINE 30 MG: 30 INJECTION, SOLUTION INTRAMUSCULAR at 14:21

## 2019-11-19 RX ADMIN — CEFAZOLIN SODIUM 2 G: 2 INJECTION, SOLUTION INTRAVENOUS at 13:00

## 2019-11-19 RX ADMIN — Medication 5 ML: at 12:16

## 2019-11-19 RX ADMIN — MIDAZOLAM 1 MG: 1 INJECTION INTRAMUSCULAR; INTRAVENOUS at 13:05

## 2019-11-19 RX ADMIN — FENTANYL CITRATE 50 MCG: 50 INJECTION, SOLUTION INTRAMUSCULAR; INTRAVENOUS at 13:07

## 2019-11-19 ASSESSMENT — MIFFLIN-ST. JEOR: SCORE: 1062.36

## 2019-11-19 NOTE — ANESTHESIA CARE TRANSFER NOTE
Patient: Lauren Tam    Procedure(s):  Endoscopic Carpal Tunnel Release  Thumb Ligament Reconstruction Tendon Interposition    Diagnosis: Right carpal tunnel syndrome [G56.01]  Arthritis [M19.90]  Diagnosis Additional Information: No value filed.    Anesthesia Type:   General, LMA, Peripheral Nerve Block     Note:  Airway :Nasal Cannula  Patient transferred to:PACU  Handoff Report: Identifed the Patient, Identified the Reponsible Provider, Reviewed the pertinent medical history, Discussed the surgical course, Reviewed Intra-OP anesthesia mangement and issues during anesthesia, Set expectations for post-procedure period and Allowed opportunity for questions and acknowledgement of understanding      Vitals: (Last set prior to Anesthesia Care Transfer)    CRNA VITALS  11/19/2019 1413 - 11/19/2019 1445      11/19/2019             Pulse:  77    SpO2:  99 %                Electronically Signed By: DAVID Jenkins CRNA  November 19, 2019  2:45 PM

## 2019-11-19 NOTE — DISCHARGE INSTRUCTIONS
Same Day Surgery Discharge Instructions  Special Precautions After Surgery - Adult    1. It is not unusual to feel lightheaded or faint, up to 24 hours after surgery or while taking pain medication.  If you have these symptoms; sit for a few minutes before standing and have someone assist you when getting up.  2. You should rest and relax for the next 24 hours and must have someone stay with you for at least 24 hours after your discharge.  3. DO NOT DRIVE any vehicle or operate mechanical equipment for 24 hours following the end of your surgery.  DO NOT DRIVE while taking narcotic pain medications that have been prescribed by your physician.  If you had a limb operated on, you must be able to use it fully to drive.  4. DO NOT drink alcoholic beverages for 24 hours following surgery or while taking prescription pain medication.  5. Drink clear liquids (apple juice, ginger ale, broth, 7-Up, etc.).  Progress to your regular diet as you feel able.  6. Any questions call your physician and do not make important decisions for 24 hours.        __________________________________________________________________________________________________________________________________  IMPORTANT NUMBERS:    [facility] Main Number:  [phone], [phone]  Pharmacy:  [phone]  Same Day Surgery:  [phone], Monday - Friday until 8:30 p.m.  Urgent Care:  [phone]  Emergency Room:  [phone]      [facility]:  [phone]                                                                             [facility]:  [phone] option 1  [facility]:  [phone]     [facility]:  [phone]   [facility]:  [phone]   Home Medical Equipment: [phone]  [facility]:  [phone]

## 2019-11-19 NOTE — OP NOTE
[facility]   Operative Note    SURGEON:  Eric Berry M.D.    ASSISTANT:  Sirisha Coronado PA-C  A first assistant was necessary in this case to provide traction and retraction, to assure safe and smooth progression throughout the case.    PREOPERATIVE DIAGNOSIS  Right thumb carpometacarpal joint arthritis  Rightt carpal tunnel syndrome    POSTOPERATIVE DIAGNOSIS  Right thumb carpometacarpal joint arthritis  Right carpal tunnel syndrome    OPERATIONS  1.  Right wrist trapeziectomy.  2.  Right thumb tendon interposition arthroplasty with flexor carpi radialis tendon transfer  3.  Right extensor pollicis brevis to abductor pollicis longus tendon transfer  4.  Right endoscopic carpal tunnel release    ANESTHESIA  Regional    SPECIMENS  Trapezium    DRAINS  None.    COMPLICATIONS  None.    ESTIMATED BLOOD LOSS  * No values recorded between 11/19/2019  1:33 PM and 11/19/2019  2:46 PM *    PROCEDURE  After discussing the risks, benefits, and alternatives to the procedure with the patient, the patient consented to proceed with the surgery as described below.  Patient understands the potential for neurovascular injury, infection, wound healing problems, loss of motion particularly with extension of the CMC joint, instability, residual pain, loss of pinch strength, and a decreased quality of life.  Patient consented to proceed.    The patient was brought to the operating room and was placed on the operating table in a supine position.  Anesthesia was administered by the anesthesiology staff.  A tourniquet was applied to the Right upper extremity, the extremity was prepped with Hibiclens and draped in the usual sterile manner.  The Right upper extremity was exsanguinated and the tourniquet was elevated to 250 mmHg for the duration of the case.     A #15 blade knife was utilized to make a transverse incision in the wrist flexion crease.  Full-thickness skin and subcutaneous flaps were raised.   A distally based flap of volar retinaculum was raised.  The Jose endoscopic system was utilized and the spatula elevator was utilized to elevate the synovium off the deep surface of the transverse carpal ligament.  The typical palpable grittiness of the ligament was appreciated.  Sequential dilators were then utilized and the endoscope was then placed into the carpal tunnel.  The nerve was carefully protected throughout the release.  The transverse carpal ligament was incised in the midsubstance of the ligament.  This was done ulnar to the median nerve.  This brought the distal edge of the ligament into better view.  This was subsequently divided with the endoscope.  This revealed the fat surrounding the superficial palmar arch.  Care was taken to avoid violating this in order to avoid damaging the arch.  The most proximal aspect of the transverse carpal ligament was subsequently divided.  Complete release was confirmed with the endoscope.  The median nerve was identified and was noted to be flattened and with mild gross evidence of scarring.  The distal volar forearm fascia was then divided with fine Salguero scissors to a level about 2 to 3 cm proximal to the skin incision.     A Young incision was made at the wrist.  The subcutaneous tissue was bluntly dissected and the lateral antebrachial cutaneous and superficial radial nerve branches were carefully protected.  A longitudinal incision was made through the joint capsule and the trapezium.  The trapezium was subperiosteally isolated.  The trapezium was removed in a piecemeal fashion taking care to protect the underlying flexor carpi radialis tendon.    A 4-mm drill was used to drill a hole perpendicular to the plane of the patient s thumbnail at the base of the thumb metacarpal exiting near the insertion point of the volar oblique ligament.  A 2-0 fiberwire suture was then inserted into the deep joint capsule and the suture ends were set side for later  use.    Two 1-cm transverse incision were made 5 and 10cm proximal to the left wrist flexion crease over the flexor carpi radialis tendon.  The subcutaneous tissue was bluntly dissected and 50% of the flexor carpi radialis tendon was isolated and was transected proximally and left attached distally.  The flexor carpi radialis tendon was then pulled out through the distal wrist incision and passed from volar to dorsal through the hole at the base of the patient s thumb metacarpal. An Arthrex 4x10mm biocomposite tenodesis screw was placed in the metacarpal with the thumb under traction, suspending the thumb. The FCR tendon was sutured to the periosteum where it exited the metacarpal, then passed through an incision in the dorsal capsule back into the trapezial space and sutured to its insertion tightly with 3-0 fiberwire.  The remaining FCR tendon graft was divided longitudinally into two.  An osteotome was used to remove the proximal half of the trapezoid.  The articular surface of the trapezoid and the distal scaphoid demonstrated significant cartilage loss.  An Arthrex small bone FT anchor was placed in the neck of the capitate.  Half of the FCR tendon graft was woven into an anchovy and secured to the anchor via the two suture limbs.  In this fashion the tendon formed an interposition arthroplasty. The remaining flexor carpi radialis tendon was then rolled into a ball (i.e. anchovy) and secured with the 3-0 fiberwire suture previously placed in the deep capsule creating an interposition arthroplasty.     The incision was thoroughly irrigated and the joint capsule was reapproximated tightly with interrupted 3-0 fiberwire suture, followed by repairing the abductor pollicis brevis similarly with 4-0 fiberwire.  The extensor pollicis brevis tendon was incised and sutured to the abductor pollicis longus tendon with 4-0 fiberwire in a mattress fashion.  The skin was closed with 4-0 nylon in a horizontal mattress  fashion.  A dressing consisting of adaptic gauze, 4x4 fluffs, webril and a thumb-spica plaster splint followed by Ace bandages was applied. The tourniquet was let down for a tourniquet time of 64 minutes.    The patient will return in 7-14 days for dressing removal and a thumb sized cast. The next follow up will be at 4 weeks postop with cast removal and radiography.

## 2019-11-19 NOTE — ANESTHESIA POSTPROCEDURE EVALUATION
Patient: Lauren Tam    Procedure(s):  Endoscopic Carpal Tunnel Release  Thumb Ligament Reconstruction Tendon Interposition    Diagnosis:Right carpal tunnel syndrome [G56.01]  Arthritis [M19.90]  Diagnosis Additional Information: No value filed.    Anesthesia Type:  General, LMA, Peripheral Nerve Block    Note:  Anesthesia Post Evaluation    Patient location during evaluation: Bedside  Patient participation: Able to participate in evaluation but full recovery from regional anesthesia has not yet ocurrred but is anticipated to occur within 48 hours  Level of consciousness: awake and alert  Pain management: adequate  Airway patency: patent  Cardiovascular status: acceptable  Respiratory status: acceptable  Hydration status: acceptable  PONV: none     Anesthetic complications: None          Last vitals:  Vitals:    11/19/19 1630 11/19/19 1645 11/19/19 1700   BP: 122/83 122/78 110/61   Pulse: 75  80   Resp: 12 12 16   Temp:      SpO2: 95% 95% 94%         Electronically Signed By: Elijah Cole CRNA, APRN CRNA  November 19, 2019  5:20 PM

## 2019-11-19 NOTE — ANESTHESIA PROCEDURE NOTES
Peripheral nerve/Neuraxial procedure note : Supraclavicular  Pre-Procedure  Performed by  Eleni Urena APRN CRNA   Location: pre-op      Pre-Anesthestic Checklist: patient identified, IV checked, site marked, risks and benefits discussed, informed consent, monitors and equipment checked, pre-op evaluation, at physician/surgeon's request and post-op pain management    Timeout  Correct Patient: Yes   Correct Procedure: Yes   Correct Site: Yes   Correct Laterality: Yes   Correct Position: Yes   Site Marked: Yes   .   Procedure Documentation    .    Procedure: Supraclavicular, right.   Patient Position:sitting Local skin infiltrated with 1 mL of 1% lidocaine.      Patient Prep/Sterile Barriers; mask, sterile gloves, chlorhexidine gluconate and isopropyl alcohol, patient draped.  Nerve Stim: Initial Level 1 mA.  Lowest motor response 0.3 mA..  Needle: insulated, short bevel   Needle Gauge: 22.    Needle Length (Inches) 2   .        Assessment/Narrative  Paresthesias: No.  Injection made incrementally with aspirations every 5 mL..  The placement was negative for: blood aspirated, painful injection and site bleeding.  Bolus given via needle. No blood aspirated via catheter.   Secured via.   Complications: none. Test dose of 3 mL at. Test dose negative for signs of intravascular, subdural or intrathecal injection.     11/19/2019 12:18 PM

## 2019-11-19 NOTE — PROGRESS NOTES
SPIRITUAL HEALTH SERVICES  SPIRITUAL ASSESSMENT Progress Note  [facility]    Lauren had requested  visit before surgery during her preop phone call.  She stated she was ready to get this over with because in a week she is planning to drive to [place] for a 3 week vacation with her fiancee.  She welcomed the support of prayer.  I offered a prayer for the surgeon and team and for the surgery / rehab to all go well.  No follow up planned at this time.    Edis Andrews M.A., [facility]  Staff   [facility]  Office: [phone]  Cell: [phone]  Pager [phone]

## 2019-11-22 ENCOUNTER — APPOINTMENT (OUTPATIENT)
Dept: GENERAL RADIOLOGY | Facility: CLINIC | Age: 59
End: 2019-11-22
Attending: STUDENT IN AN ORGANIZED HEALTH CARE EDUCATION/TRAINING PROGRAM
Payer: COMMERCIAL

## 2019-11-22 ENCOUNTER — HOSPITAL ENCOUNTER (EMERGENCY)
Facility: CLINIC | Age: 59
Discharge: HOME | End: 2019-11-22
Attending: STUDENT IN AN ORGANIZED HEALTH CARE EDUCATION/TRAINING PROGRAM | Admitting: STUDENT IN AN ORGANIZED HEALTH CARE EDUCATION/TRAINING PROGRAM
Payer: COMMERCIAL

## 2019-11-22 VITALS
BODY MASS INDEX: 19.8 KG/M2 | DIASTOLIC BLOOD PRESSURE: 90 MMHG | RESPIRATION RATE: 18 BRPM | TEMPERATURE: 98.3 F | OXYGEN SATURATION: 96 % | SYSTOLIC BLOOD PRESSURE: 146 MMHG | WEIGHT: 110 LBS

## 2019-11-22 DIAGNOSIS — G89.18 ACUTE POST-OPERATIVE PAIN: ICD-10-CM

## 2019-11-22 DIAGNOSIS — J18.9 PNEUMONIA OF LEFT LOWER LOBE DUE TO INFECTIOUS ORGANISM: ICD-10-CM

## 2019-11-22 LAB
ALBUMIN SERPL-MCNC: 3 G/DL (ref 3.4–5)
ALP SERPL-CCNC: 63 U/L (ref 40–150)
ALT SERPL W P-5'-P-CCNC: 37 U/L (ref 0–50)
ANION GAP SERPL CALCULATED.3IONS-SCNC: 3 MMOL/L (ref 3–14)
AST SERPL W P-5'-P-CCNC: 31 U/L (ref 0–45)
BASOPHILS # BLD AUTO: 0.1 10E9/L (ref 0–0.2)
BASOPHILS NFR BLD AUTO: 1 %
BILIRUB SERPL-MCNC: 0.4 MG/DL (ref 0.2–1.3)
BUN SERPL-MCNC: 12 MG/DL (ref 7–30)
CALCIUM SERPL-MCNC: 8.4 MG/DL (ref 8.5–10.1)
CHLORIDE SERPL-SCNC: 108 MMOL/L (ref 94–109)
CO2 SERPL-SCNC: 29 MMOL/L (ref 20–32)
CREAT SERPL-MCNC: 0.62 MG/DL (ref 0.52–1.04)
CRP SERPL-MCNC: 6.7 MG/L (ref 0–8)
D DIMER PPP FEU-MCNC: 0.3 UG/ML FEU (ref 0–0.5)
DIFFERENTIAL METHOD BLD: NORMAL
EOSINOPHIL # BLD AUTO: 0.3 10E9/L (ref 0–0.7)
EOSINOPHIL NFR BLD AUTO: 3.4 %
ERYTHROCYTE [DISTWIDTH] IN BLOOD BY AUTOMATED COUNT: 12.9 % (ref 10–15)
ERYTHROCYTE [SEDIMENTATION RATE] IN BLOOD BY WESTERGREN METHOD: 6 MM/H (ref 0–30)
GFR SERPL CREATININE-BSD FRML MDRD: >90 ML/MIN/{1.73_M2}
GLUCOSE SERPL-MCNC: 90 MG/DL (ref 70–99)
HCT VFR BLD AUTO: 43 % (ref 35–47)
HGB BLD-MCNC: 14 G/DL (ref 11.7–15.7)
IMM GRANULOCYTES # BLD: 0 10E9/L (ref 0–0.4)
IMM GRANULOCYTES NFR BLD: 0.2 %
LYMPHOCYTES # BLD AUTO: 2.1 10E9/L (ref 0.8–5.3)
LYMPHOCYTES NFR BLD AUTO: 23.9 %
MCH RBC QN AUTO: 30.9 PG (ref 26.5–33)
MCHC RBC AUTO-ENTMCNC: 32.6 G/DL (ref 31.5–36.5)
MCV RBC AUTO: 95 FL (ref 78–100)
MONOCYTES # BLD AUTO: 0.9 10E9/L (ref 0–1.3)
MONOCYTES NFR BLD AUTO: 10.4 %
NEUTROPHILS # BLD AUTO: 5.4 10E9/L (ref 1.6–8.3)
NEUTROPHILS NFR BLD AUTO: 61.1 %
NRBC # BLD AUTO: 0 10*3/UL
NRBC BLD AUTO-RTO: 0 /100
PLATELET # BLD AUTO: 282 10E9/L (ref 150–450)
POTASSIUM SERPL-SCNC: 3.8 MMOL/L (ref 3.4–5.3)
PROT SERPL-MCNC: 6.1 G/DL (ref 6.8–8.8)
RBC # BLD AUTO: 4.53 10E12/L (ref 3.8–5.2)
SODIUM SERPL-SCNC: 140 MMOL/L (ref 133–144)
TROPONIN I SERPL-MCNC: <0.015 UG/L (ref 0–0.04)
WBC # BLD AUTO: 8.9 10E9/L (ref 4–11)

## 2019-11-22 PROCEDURE — 85025 COMPLETE CBC W/AUTO DIFF WBC: CPT | Performed by: STUDENT IN AN ORGANIZED HEALTH CARE EDUCATION/TRAINING PROGRAM

## 2019-11-22 PROCEDURE — 93010 ELECTROCARDIOGRAM REPORT: CPT | Mod: Z6 | Performed by: STUDENT IN AN ORGANIZED HEALTH CARE EDUCATION/TRAINING PROGRAM

## 2019-11-22 PROCEDURE — 93005 ELECTROCARDIOGRAM TRACING: CPT | Performed by: STUDENT IN AN ORGANIZED HEALTH CARE EDUCATION/TRAINING PROGRAM

## 2019-11-22 PROCEDURE — 25000128 H RX IP 250 OP 636: Performed by: STUDENT IN AN ORGANIZED HEALTH CARE EDUCATION/TRAINING PROGRAM

## 2019-11-22 PROCEDURE — 84484 ASSAY OF TROPONIN QUANT: CPT | Performed by: STUDENT IN AN ORGANIZED HEALTH CARE EDUCATION/TRAINING PROGRAM

## 2019-11-22 PROCEDURE — 85652 RBC SED RATE AUTOMATED: CPT | Performed by: STUDENT IN AN ORGANIZED HEALTH CARE EDUCATION/TRAINING PROGRAM

## 2019-11-22 PROCEDURE — 71046 X-RAY EXAM CHEST 2 VIEWS: CPT

## 2019-11-22 PROCEDURE — 85379 FIBRIN DEGRADATION QUANT: CPT | Performed by: STUDENT IN AN ORGANIZED HEALTH CARE EDUCATION/TRAINING PROGRAM

## 2019-11-22 PROCEDURE — 25000132 ZZH RX MED GY IP 250 OP 250 PS 637: Performed by: STUDENT IN AN ORGANIZED HEALTH CARE EDUCATION/TRAINING PROGRAM

## 2019-11-22 PROCEDURE — 99285 EMERGENCY DEPT VISIT HI MDM: CPT | Mod: 25 | Performed by: STUDENT IN AN ORGANIZED HEALTH CARE EDUCATION/TRAINING PROGRAM

## 2019-11-22 PROCEDURE — 80053 COMPREHEN METABOLIC PANEL: CPT | Performed by: STUDENT IN AN ORGANIZED HEALTH CARE EDUCATION/TRAINING PROGRAM

## 2019-11-22 PROCEDURE — 86140 C-REACTIVE PROTEIN: CPT | Performed by: STUDENT IN AN ORGANIZED HEALTH CARE EDUCATION/TRAINING PROGRAM

## 2019-11-22 PROCEDURE — 25800030 ZZH RX IP 258 OP 636: Performed by: STUDENT IN AN ORGANIZED HEALTH CARE EDUCATION/TRAINING PROGRAM

## 2019-11-22 PROCEDURE — 96374 THER/PROPH/DIAG INJ IV PUSH: CPT | Performed by: STUDENT IN AN ORGANIZED HEALTH CARE EDUCATION/TRAINING PROGRAM

## 2019-11-22 RX ORDER — DOXYCYCLINE 100 MG/1
100 CAPSULE ORAL 2 TIMES DAILY
Qty: 20 CAPSULE | Refills: 0 | Status: SHIPPED | OUTPATIENT
Start: 2019-11-22 | End: 2019-12-02

## 2019-11-22 RX ORDER — OXYCODONE HYDROCHLORIDE 5 MG/1
5-10 TABLET ORAL EVERY 4 HOURS PRN
Qty: 10 TABLET | Refills: 0 | Status: SHIPPED | OUTPATIENT
Start: 2019-11-22 | End: 2021-11-21

## 2019-11-22 RX ORDER — OXYCODONE HYDROCHLORIDE 5 MG/1
5 TABLET ORAL ONCE
Status: COMPLETED | OUTPATIENT
Start: 2019-11-22 | End: 2019-11-22

## 2019-11-22 RX ORDER — KETOROLAC TROMETHAMINE 15 MG/ML
10 INJECTION, SOLUTION INTRAMUSCULAR; INTRAVENOUS ONCE
Status: COMPLETED | OUTPATIENT
Start: 2019-11-22 | End: 2019-11-22

## 2019-11-22 RX ADMIN — KETOROLAC TROMETHAMINE 10 MG: 15 INJECTION, SOLUTION INTRAMUSCULAR; INTRAVENOUS at 10:14

## 2019-11-22 RX ADMIN — OXYCODONE HYDROCHLORIDE 5 MG: 5 TABLET ORAL at 10:19

## 2019-11-22 RX ADMIN — SODIUM CHLORIDE 500 ML: 9 INJECTION, SOLUTION INTRAVENOUS at 10:22

## 2019-11-22 NOTE — ED PROVIDER NOTES
History     Chief Complaint   Patient presents with     Arm Pain     carpal tunnel surgery done on tuesday here at [facility] right arm pain,  no relief from pain medication at home     Chest Pain     HPI  Lauren Tam is a 59 year old female 4 days S/P right wrist trapeziectomy, thumb tendon arthroplasty with flexor carpi radialis tendon transfer, extensor pollicis brevis to abductor pollicis longus transfer, and endoscopic carpal tunnel release who presents to the department complaining of significant right wrist pain and also chest pain.  She says that since procedure was performed on 11/19/2019 she has had progressive right wrist pain refractory to prescription hydroxyzine and hydrocodone/acetaminophen taken as prescribed.  She has not yet had a postoperative appointment or talked with her orthopedic team.  Over the past 24 hours she has begun appreciating pleuritic left-sided chest pain radiating to the left back.  The pain is sharp and achy, exacerbated with deep inspiration but also coughing.  Her cough is only mild increased from baseline but without productivity, hemoptysis, chest pressure or sensation of shortness of breath.  No lower extremity pain or swelling.  No known history of CAD, CVA, or VTE.    Allergies:  Allergies   Allergen Reactions     Nkda [No Known Drug Allergies]        Problem List:    Patient Active Problem List    Diagnosis Date Noted     ASCUS with positive high risk human papillomavirus of vagina 01/04/2017     Priority: Medium     S/P complete hysterectomy 01/04/2017     Priority: Medium     Severe episode of recurrent major depressive disorder, without psychotic features (H) 09/12/2016     Priority: Medium     Advanced directives, counseling/discussion 09/25/2015     Priority: Medium     Information given to patient       S/P LEEP of cervix-CIN2 06/23/2015     Priority: Medium     1/9/2015:Pap--NIL, +HR HPV type 18 with an additional HR HPV type identified as well. Plan  [place]-  4/22/15: Pt noncompliant. [place] not done. Reminder placed for 6 month pap.   6/5/15:[place] Bx - Negative, ECC - suspicious for HSIL. Pap--ASCUS, +HR HPV type 18 & other HR HPV type(s) identified. Plan colp/possible LEEP with GYN  6/23/15: LEEP - ELAINA 2, indeterminate endocervical extension. Plan cytology and repeat ECC in 4-6 months.   10/12/15: Hysterectomy - negative. Repeat pap in 1 year.   12/2/16: ASCUS/+ HR HPV (not 16 or 18): Plan: plan colp within 3 months.   1/4/17: [place] - visually normal; no biopsies taken. Plan cotest in 1 year.   11/14/17 Pap: NIL/neg HPV. Plan Pap+HPV in 1 year.  12/6/18 Lost to follow-up for pap tracking  01/28/19 Vag pap = NIL, Neg HPV plan pap in 3 years. Screening x 20 years post LEEP.          CARDIOVASCULAR SCREENING; LDL GOAL LESS THAN 160 10/31/2010     Priority: Medium        Past Medical History:    Past Medical History:   Diagnosis Date     ASCUS with positive high risk HPV cervical 12/2/16     Cervical high risk HPV (human papillomavirus) test positive 1/16/2015     Excessive or frequent menstruation      H/O colposcopy with cervical biopsy 6/5/2015       Past Surgical History:    Past Surgical History:   Procedure Laterality Date     COLONOSCOPY  3/2/2011    COLONOSCOPY performed by CESAR GONZALEZ at [facility]     CYSTOSCOPY N/A 10/12/2015    Procedure: CYSTOSCOPY;  Surgeon: Hunter Cruz MD;  Location: [facility]     ENDOSCOPIC RELEASE CARPAL TUNNEL Right 11/19/2019    Procedure: Endoscopic Carpal Tunnel Release;  Surgeon: Eric Berry MD;  Location: [facility]     INTERPOSITION TENDON HAND Left 12/5/2017    Procedure: INTERPOSITION TENDON HAND;  Left thumb ligament reconstruction tendon interposition with hemitrapezoidectomy and thumb metacarpophalangeal joint volar capsulodesis;  Surgeon: Eric Berry MD;  Location: [facility]     LAPAROSCOPIC HYSTERECTOMY TOTAL, BILATERAL SALPINGO-OOPHORECTOMY, COMBINED N/A 10/12/2015    Procedure: COMBINED  LAPAROSCOPIC HYSTERECTOMY TOTAL, SALPINGO-OOPHORECTOMY;  Surgeon: Hunter Cruz MD;  Location: [facility]     LEEP TX, CERVICAL  6/23/15    ELAINA 1/2 with indeterminate margin     REPAIR TENDON FINGER(S) Right 2019    Procedure: Thumb Ligament Reconstruction Tendon Interposition;  Surgeon: Eric Berry MD;  Location: [facility]     SURGICAL HISTORY OF -       T&A     SURGICAL HISTORY OF -       PE Tubes x6 starting      SURGICAL HISTORY OF -       Appy     SURGICAL HISTORY OF -       D&C     SURGICAL HISTORY OF -       Tubal Ligation     SURGICAL HISTORY OF -       Arthroscopy (R) Knee       Family History:    Family History   Problem Relation Age of Onset     Prostate Cancer Father      Aneurysm Father         addominal     Heart Disease Paternal Grandmother      Heart Failure Paternal Grandmother      Cerebrovascular Disease Paternal Grandfather      Family History Negative Mother      Liver Disease Maternal Grandfather      Heart Disease Brother      Heart Disease Sister      Heart Disease Son      Heart Disease Daughter      Heart Disease Brother      Heart Disease Sister      Heart Disease Sister      Heart Disease Son        Social History:  Marital Status:   [4]  Social History     Tobacco Use     Smoking status: Former Smoker     Years: 6.00     Types: Cigarettes     Last attempt to quit: 1984     Years since quittin.9     Smokeless tobacco: Never Used     Tobacco comment: 23 years ago quit date   Substance Use Topics     Alcohol use: Yes     Comment: 3 drinks a weekly     Drug use: No        Medications:    doxycycline hyclate (VIBRAMYCIN) 100 MG capsule  oxyCODONE (ROXICODONE) 5 MG tablet  acetaminophen (TYLENOL) 325 MG tablet  Aspirin-Acetaminophen-Caffeine (EXCEDRIN PO)  fluticasone (FLONASE) 50 MCG/ACT nasal spray  GLUCOSAMINE-CHONDROITIN PO  HYDROcodone-acetaminophen (NORCO) 5-325 MG tablet  hydrOXYzine (ATARAX) 25 MG tablet  MULTIVITAMIN TABS    OR  ondansetron (ZOFRAN-ODT) 4 MG ODT tab  senna-docusate (SENOKOT-S/PERICOLACE) 8.6-50 MG tablet          Review of Systems  Constitutional:  Negative for fever or recent illness.  Cardiovascular: Positive for sharp left-sided pleuritic chest pain.  Respiratory:  Negative for cough, hemoptysis, or shortness of breath.  Gastrointestinal:  Negative for abdominal pain, vomiting, or diarrhea.    Skill skeletal: Positive for postoperative right wrist pain.  Neurological:  Negative for headache or dizziness.    All others reviewed and are negative.      Physical Exam   BP: (!) 146/90  Heart Rate: 84  Temp: 98.3  F (36.8  C)  Resp: 18  Weight: 49.9 kg (110 lb)  SpO2: 96 %      Physical Exam  Constitutional:  Well developed, well nourished.  Appears uncomfortable but nontoxic sitting on the edge of the gurney.  HENT:  Normocephalic and atraumatic.  Symmetric in appearance.  Eyes:  Conjunctivae are normal.  Neck:  Neck supple.  Cardiovascular:  No cyanosis.  RRR.  No audible murmurs noted.  No lower extremity edema or asymmetry.   Respiratory:  Effort normal without sign of respiratory distress.  Faint bibasilar crackles without wheezing.  Gastrointestinal:  Soft nondistended abdomen.  Nontender and without guarding.  No rigidity or rebound tenderness.    Musculoskeletal:   Right wrist and forearm postoperative incision sites appear clean, dry, and intact without dehiscence.  Minimal swelling.  Sensation intact of all digits along median, radial, and ulnar nerve distributions.  No cyanosis and capillary refill less than 2 seconds in each digit.  2/4 palpable radial and ulnar pulses.  Neurological:  Patient is alert.  Skin:  Skin is warm and dry.  Psychiatric:  Normal mood and affect.      ED Course        Procedures                 EKG Interpretation:      Interpreted by: Fausto Chapman  Time reviewed: Upon completion    Symptoms at time of EKG: Pleuritic chest discomfort  Rhythm: Sinus  Rate: Normal  Axis: Normal     Conduction: None atypical   ST Segments/ T Waves: No pathologic ST-elevations or T-wave abnormalities.  Q Waves: None  Comparison to prior: None readily available    Clinical Impression: No sign of ischemia         Critical Care time:  none               Results for orders placed or performed during the hospital encounter of 11/22/19 (from the past 24 hour(s))   Comprehensive metabolic panel   Result Value Ref Range    Sodium 140 133 - 144 mmol/L    Potassium 3.8 3.4 - 5.3 mmol/L    Chloride 108 94 - 109 mmol/L    Carbon Dioxide 29 20 - 32 mmol/L    Anion Gap 3 3 - 14 mmol/L    Glucose 90 70 - 99 mg/dL    Urea Nitrogen 12 7 - 30 mg/dL    Creatinine 0.62 0.52 - 1.04 mg/dL    GFR Estimate >90 >60 mL/min/[1.73_m2]    GFR Estimate If Black >90 >60 mL/min/[1.73_m2]    Calcium 8.4 (L) 8.5 - 10.1 mg/dL    Bilirubin Total 0.4 0.2 - 1.3 mg/dL    Albumin 3.0 (L) 3.4 - 5.0 g/dL    Protein Total 6.1 (L) 6.8 - 8.8 g/dL    Alkaline Phosphatase 63 40 - 150 U/L    ALT 37 0 - 50 U/L    AST 31 0 - 45 U/L   CBC with platelets differential   Result Value Ref Range    WBC 8.9 4.0 - 11.0 10e9/L    RBC Count 4.53 3.8 - 5.2 10e12/L    Hemoglobin 14.0 11.7 - 15.7 g/dL    Hematocrit 43.0 35.0 - 47.0 %    MCV 95 78 - 100 fl    MCH 30.9 26.5 - 33.0 pg    MCHC 32.6 31.5 - 36.5 g/dL    RDW 12.9 10.0 - 15.0 %    Platelet Count 282 150 - 450 10e9/L    Diff Method Automated Method     % Neutrophils 61.1 %    % Lymphocytes 23.9 %    % Monocytes 10.4 %    % Eosinophils 3.4 %    % Basophils 1.0 %    % Immature Granulocytes 0.2 %    Nucleated RBCs 0 0 /100    Absolute Neutrophil 5.4 1.6 - 8.3 10e9/L    Absolute Lymphocytes 2.1 0.8 - 5.3 10e9/L    Absolute Monocytes 0.9 0.0 - 1.3 10e9/L    Absolute Eosinophils 0.3 0.0 - 0.7 10e9/L    Absolute Basophils 0.1 0.0 - 0.2 10e9/L    Abs Immature Granulocytes 0.0 0 - 0.4 10e9/L    Absolute Nucleated RBC 0.0    D dimer quantitative   Result Value Ref Range    D Dimer 0.3 0.0 - 0.50 ug/ml FEU   Troponin I    Result Value Ref Range    Troponin I ES <0.015 0.000 - 0.045 ug/L   CRP Inflammation   Result Value Ref Range    CRP Inflammation 6.7 0.0 - 8.0 mg/L   Erythrocyte sedimentation rate auto   Result Value Ref Range    Sed Rate 6 0 - 30 mm/h   XR Chest 2 Views    Narrative    CHEST TWO VIEW   11/22/2019 10:59 AM     HISTORY: Pleuritic chest pain.    COMPARISON: None.      Impression    IMPRESSION: Patchy opacities may be developing airspace disease  apparently at the left lower lobe and best seen on the lateral view.  Normal cardiac silhouette. No effusion.       Medications   0.9% sodium chloride BOLUS (0 mLs Intravenous Stopped 11/22/19 1045)   ketorolac (TORADOL) injection 10 mg (10 mg Intravenous Given 11/22/19 1014)   oxyCODONE (ROXICODONE) tablet 5 mg (5 mg Oral Given 11/22/19 1019)       Assessments & Plan (with Medical Decision Making)   Lauren Tam is a 59 year old female who presents to the department for evaluation of 2 complaints, first being postoperative right wrist pain refractory to prescription for codeine/acetaminophen.  Secondarily she has had achy pleuritic left sided chest discomfort with mild cough.  She has no history of VTE, obvious signs of DVT on examination, and her d-dimer within reference range.  Her chest radiograph was independently reviewed and without sign of pneumothorax, agree with radiologist read of left lower lobe haziness or opacification.  Clinically this likely represents pneumonia, denies history of tobacco use but does have a remote history of asthma.  No wheezing or necessity for steroids and/or albuterol.  Regarding her postoperative pain, site appears clean and without signs of infection.  Afebrile and CBC without leukocytosis.  Consulted Dr. Berry who recalls the patient well, recommends discontinuing hydrocodone/acetaminophen and instead using oxycodone as directed, postoperative pain is likely to resolve over the next 1-2 days.  Patient is in agreement with  this discharge plan including outpatient follow-up with primary or the pedis if symptoms persist.          Disclaimer:  This note consists of symbols derived from keyboarding, dictation, and/or voice recognition software.  As a result, there may be errors in the script that have gone undetected.  Please consider this when interpreting information found in the chart.        I have reviewed the nursing notes.    I have reviewed the findings, diagnosis, plan and need for follow up with the patient.      New Prescriptions    DOXYCYCLINE HYCLATE (VIBRAMYCIN) 100 MG CAPSULE    Take 1 capsule (100 mg) by mouth 2 times daily for 10 days    OXYCODONE (ROXICODONE) 5 MG TABLET    Take 1-2 tablets (5-10 mg) by mouth every 4 hours as needed for severe pain       Final diagnoses:   Acute post-operative pain   Pneumonia of left lower lobe due to infectious organism (H)       11/22/2019   [facility] EMERGENCY DEPARTMENT     Fausto Chapman DO  11/22/19 [id number]

## 2019-11-22 NOTE — ED AVS SNAPSHOT
[facility] Emergency Department  [address] [address]  Phone:  [phone]  Fax:  [phone]                                    Lauren Tam   MRN: [id number]    Department:  [facility] Emergency Department   Date of Visit:  11/22/2019           After Visit Summary Signature Page    I have received my discharge instructions, and my questions have been answered. I have discussed any challenges I see with this plan with the nurse or doctor.    ..........................................................................................................................................  Patient/Patient Representative Signature      ..........................................................................................................................................  Patient Representative Print Name and Relationship to Patient    ..................................................               ................................................  Date                                   Time    ..........................................................................................................................................  Reviewed by Signature/Title    ...................................................              ..............................................  Date                                               Time          22EPIC Rev 08/18

## 2019-11-25 LAB — COPATH REPORT: NORMAL

## 2019-12-03 ENCOUNTER — TRANSFERRED RECORDS (OUTPATIENT)
Dept: HEALTH INFORMATION MANAGEMENT | Facility: CLINIC | Age: 59
End: 2019-12-03

## 2020-01-14 ENCOUNTER — TRANSFERRED RECORDS (OUTPATIENT)
Dept: HEALTH INFORMATION MANAGEMENT | Facility: CLINIC | Age: 60
End: 2020-01-14

## 2020-01-24 ENCOUNTER — ANCILLARY PROCEDURE (OUTPATIENT)
Dept: GENERAL RADIOLOGY | Facility: CLINIC | Age: 60
End: 2020-01-24
Attending: NURSE PRACTITIONER
Payer: COMMERCIAL

## 2020-01-24 ENCOUNTER — OFFICE VISIT (OUTPATIENT)
Dept: URGENT CARE | Facility: URGENT CARE | Age: 60
End: 2020-01-24
Payer: COMMERCIAL

## 2020-01-24 VITALS
HEART RATE: 74 BPM | SYSTOLIC BLOOD PRESSURE: 128 MMHG | TEMPERATURE: 98.8 F | BODY MASS INDEX: 22.25 KG/M2 | DIASTOLIC BLOOD PRESSURE: 52 MMHG | OXYGEN SATURATION: 99 % | WEIGHT: 123.6 LBS | RESPIRATION RATE: 16 BRPM

## 2020-01-24 DIAGNOSIS — Z87.01 HISTORY OF PNEUMONIA: ICD-10-CM

## 2020-01-24 DIAGNOSIS — J40 BRONCHITIS: Primary | ICD-10-CM

## 2020-01-24 PROCEDURE — 71046 X-RAY EXAM CHEST 2 VIEWS: CPT

## 2020-01-24 PROCEDURE — 99214 OFFICE O/P EST MOD 30 MIN: CPT | Performed by: NURSE PRACTITIONER

## 2020-01-24 RX ORDER — PREDNISONE 20 MG/1
40 TABLET ORAL DAILY
Qty: 10 TABLET | Refills: 0 | Status: SHIPPED | OUTPATIENT
Start: 2020-01-24 | End: 2020-01-29

## 2020-01-24 RX ORDER — ALBUTEROL SULFATE 90 UG/1
2 AEROSOL, METERED RESPIRATORY (INHALATION) EVERY 6 HOURS PRN
Qty: 1 INHALER | Refills: 0 | Status: SHIPPED | OUTPATIENT
Start: 2020-01-24

## 2020-01-24 NOTE — NURSING NOTE
"Chief Complaint   Patient presents with     Back Pain     Left upper back pain.  No cough, no fevers.  Hx of Pneumonia 11/22.  Doesnt feel like ever cleared. Spot hurts more when takes deep breath       Initial /52 (BP Location: Right arm, Patient Position: Chair, Cuff Size: Adult Regular)   Pulse 74   Temp 98.8  F (37.1  C) (Tympanic)   Resp 16   Wt 56.1 kg (123 lb 9.6 oz)   LMP 03/17/2008   SpO2 99%   BMI 22.25 kg/m   Estimated body mass index is 22.25 kg/m  as calculated from the following:    Height as of 11/19/19: 1.588 m (5' 2.5\").    Weight as of this encounter: 56.1 kg (123 lb 9.6 oz).    Patient presents to the clinic using No DME    Health Maintenance that is potentially due pending provider review:  NONE    n/a    Is there anyone who you would like to be able to receive your results? No  If yes have patient fill out YOBANY    Elijah Cerna M.A.      "

## 2020-01-24 NOTE — PROGRESS NOTES
Subjective     Lauren Tam is a 59 year old female who presents to clinic today for the following health issues:    HPI   Chief Complaint   Patient presents with     Back Pain     Left upper back pain.  No cough, no fevers.  Hx of Pneumonia 11/22.  Doesnt feel like ever cleared. Spot hurts more when takes deep breath           Patient Active Problem List   Diagnosis     CARDIOVASCULAR SCREENING; LDL GOAL LESS THAN 160     S/P LEEP of cervix-CIN2     Advanced directives, counseling/discussion     Severe episode of recurrent major depressive disorder, without psychotic features (H)     ASCUS with positive high risk human papillomavirus of vagina     S/P complete hysterectomy     Past Surgical History:   Procedure Laterality Date     COLONOSCOPY  3/2/2011    COLONOSCOPY performed by CESAR GONZALEZ at [facility]     CYSTOSCOPY N/A 10/12/2015    Procedure: CYSTOSCOPY;  Surgeon: Hunter Cruz MD;  Location: [facility]     ENDOSCOPIC RELEASE CARPAL TUNNEL Right 11/19/2019    Procedure: Endoscopic Carpal Tunnel Release;  Surgeon: Eric Berry MD;  Location: [facility]     INTERPOSITION TENDON HAND Left 12/5/2017    Procedure: INTERPOSITION TENDON HAND;  Left thumb ligament reconstruction tendon interposition with hemitrapezoidectomy and thumb metacarpophalangeal joint volar capsulodesis;  Surgeon: Eric Berry MD;  Location: [facility]     LAPAROSCOPIC HYSTERECTOMY TOTAL, BILATERAL SALPINGO-OOPHORECTOMY, COMBINED N/A 10/12/2015    Procedure: COMBINED LAPAROSCOPIC HYSTERECTOMY TOTAL, SALPINGO-OOPHORECTOMY;  Surgeon: Hunter Cruz MD;  Location: [facility]     LEEP TX, CERVICAL  6/23/15    ELAINA 1/2 with indeterminate margin     REPAIR TENDON FINGER(S) Right 11/19/2019    Procedure: Thumb Ligament Reconstruction Tendon Interposition;  Surgeon: Eric Berry MD;  Location: [facility]     SURGICAL HISTORY OF -   1967    T&A     SURGICAL HISTORY OF -       PE Tubes x6 starting 1967     SURGICAL HISTORY  OF -       Appy     SURGICAL HISTORY OF -       D&C     SURGICAL HISTORY OF -       Tubal Ligation     SURGICAL HISTORY OF -       Arthroscopy (R) Knee       Social History     Tobacco Use     Smoking status: Former Smoker     Years: 6.00     Types: Cigarettes     Last attempt to quit: 1984     Years since quittin.0     Smokeless tobacco: Never Used     Tobacco comment: 23 years ago quit date   Substance Use Topics     Alcohol use: Yes     Comment: 3 drinks a weekly     Family History   Problem Relation Age of Onset     Prostate Cancer Father      Aneurysm Father         addominal     Heart Disease Paternal Grandmother      Heart Failure Paternal Grandmother      Cerebrovascular Disease Paternal Grandfather      Family History Negative Mother      Liver Disease Maternal Grandfather      Heart Disease Brother      Heart Disease Sister      Heart Disease Son      Heart Disease Daughter      Heart Disease Brother      Heart Disease Sister      Heart Disease Sister      Heart Disease Son          Current Outpatient Medications   Medication Sig Dispense Refill     albuterol (PROAIR HFA/PROVENTIL HFA/VENTOLIN HFA) 108 (90 Base) MCG/ACT inhaler Inhale 2 puffs into the lungs every 6 hours as needed for shortness of breath / dyspnea or wheezing 1 Inhaler 0     Aspirin-Acetaminophen-Caffeine (EXCEDRIN PO) Take by mouth as needed        fluticasone (FLONASE) 50 MCG/ACT nasal spray Spray 1 spray into both nostrils 2 times daily 18.2 mL 3     GLUCOSAMINE-CHONDROITIN PO Take 2 tablets by mouth daily       HYDROcodone-acetaminophen (NORCO) 5-325 MG tablet Take 1-2 tablets by mouth every 4 hours as needed for moderate to severe pain 25 tablet 0     hydrOXYzine (ATARAX) 25 MG tablet Take 1-2 tablets (25-50 mg) by mouth every 6 hours as needed for itching (nausea or adjuvant pain) 25 tablet 1     MULTIVITAMIN TABS   OR 1 TABLET DAILY       ondansetron (ZOFRAN-ODT) 4 MG ODT tab Take 1-2 tablets (4-8 mg) by mouth  every 8 hours as needed for nausea 4 tablet 0     predniSONE (DELTASONE) 20 MG tablet Take 2 tablets (40 mg) by mouth daily for 5 days 10 tablet 0     senna-docusate (SENOKOT-S/PERICOLACE) 8.6-50 MG tablet Take 1-2 tablets by mouth 2 times daily 25 tablet 0     acetaminophen (TYLENOL) 325 MG tablet Take 3 tablets (975 mg) by mouth every 8 hours 100 tablet 0     oxyCODONE (ROXICODONE) 5 MG tablet Take 1-2 tablets (5-10 mg) by mouth every 4 hours as needed for severe pain (Patient not taking: Reported on 1/24/2020) 10 tablet 0     Allergies   Allergen Reactions     Nkda [No Known Drug Allergies]          Reviewed and updated as needed this visit by Provider  Tobacco  Allergies  Meds  Problems  Med Hx  Surg Hx  Fam Hx         Review of Systems   ROS COMP: Constitutional, HEENT, cardiovascular, pulmonary, GI, , musculoskeletal, neuro, skin, endocrine and psych systems are negative, except as otherwise noted.      Objective    /52 (BP Location: Right arm, Patient Position: Chair, Cuff Size: Adult Regular)   Pulse 74   Temp 98.8  F (37.1  C) (Tympanic)   Resp 16   Wt 56.1 kg (123 lb 9.6 oz)   LMP 03/17/2008   SpO2 99%   BMI 22.25 kg/m    Body mass index is 22.25 kg/m .  Physical Exam   GENERAL: healthy, alert and no distress, nontoxic in appearance  EYES: Eyes grossly normal to inspection, PERRL and conjunctivae and sclerae normal  HENT: ear canals and TM's normal, nose and mouth without ulcers or lesions  NECK: no adenopathy, supple with full ROM  RESP: lungs clear to auscultation - no rales, rhonchi or wheezes  CV: regular rate and rhythm, normal S1 S2, no S3 or S4, no murmur, click or rub, no peripheral edema   ABDOMEN: soft, nontender, no hepatosplenomegaly, no masses and bowel sounds normal  MS: no gross musculoskeletal defects noted, no edema  No rash    Diagnostic Test Results: Possible right middle lobe infiltrate. Will follow up with over read as indicated.    CHEST TWO VIEW   1/24/2020 6:16  PM      HISTORY: History of pneumonia.     COMPARISON: Chest x-ray 11/22/2019.                                                                      IMPRESSION: PA and lateral views of the chest. Lungs are clear. Heart  is normal in size. No effusions are evident. No pneumothorax.  Degenerative spine changes are noted in the upper lumbar region.      Labs reviewed in Epic  No results found for this or any previous visit (from the past 24 hour(s)).        Assessment & Plan   Problem List Items Addressed This Visit     None      Visit Diagnoses     Bronchitis    -  Primary    Relevant Medications    predniSONE (DELTASONE) 20 MG tablet    albuterol (PROAIR HFA/PROVENTIL HFA/VENTOLIN HFA) 108 (90 Base) MCG/ACT inhaler    History of pneumonia        Relevant Orders    XR Chest 2 Views (Completed)               Patient Instructions   Increase rest and fluids. Tylenol and/or Ibuprofen for comfort. Cool mist vaporizer. If your symptoms worsen or do not resolve follow up with your primary care provider in 1 week and sooner if needed.      Mucinex 600 mg 12 hour formula for ear, head and chest congestion.  It can also thin post nasal drip which can cause a cough and sore throat.    Indications for emergent return to emergency department discussed with patient, who verbalized good understanding and agreement.  Patient understands the limitations of today's evaluation.           Patient Education     Viral Bronchitis (Adult)    You have a viral bronchitis. Bronchitis is inflammation and swelling of the lining of the lungs. This is often caused by an infection. Symptoms include a dry, hacking cough that is worse at night. The cough may bring up yellow-green mucus. You may also feel short of breath or wheeze. Other symptoms may include tiredness, chest discomfort, and chills.  Bronchitis that is caused by a virus is not treated with antibiotics. Instead, medicines may be given to help relieve symptoms. Symptoms can last up to 2  weeks, although the cough may last much longer.  This illness is contagious during the first few days and is spread through the air by coughing and sneezing, or by direct contact (touching the sick person and then touching your own eyes, nose, or mouth).  Most viral illnesses resolve within 10 to 14 days with rest and simple home remedies, although they may sometimes last for several weeks.  Home care    If symptoms are severe, rest at home for the first 2 to 3 days. When you go back to your usual activities, don't let yourself get too tired.    Do not smoke. Also avoid being exposed to secondhand smoke.    You may use over-the-counter medicine to control fever or pain, unless another pain medicine was prescribed. If you have chronic liver or kidney disease or have ever had a stomach ulcer or gastrointestinal bleeding, talk with your healthcare provider before using these medicines. Also talk to your provider if you are taking medicine to prevent blood clots. Aspirin should never be given to anyone younger than 18 years of age who is ill with a viral infection or fever. It may cause severe liver or brain damage.    Your appetite may be poor, so a light diet is fine. Avoid dehydration by drinking 6 to 8 glasses of fluids per day (such as water, soft drinks, sports drinks, juices, tea, or soup). Extra fluids will help loosen secretions in the nose and lungs.    Over-the-counter cough, cold, and sore-throat medicines will not shorten the length of the illness, but they may help to reduce symptoms. Don't use decongestants if you have high blood pressure.  Follow-up care  Follow up with your healthcare provider, or as advised. If you had an X-ray or ECG (electrocardiogram), a specialist will review it. You will be notified of any new findings that may affect your care.  If you are age 65 or older, or if you have a chronic lung disease or condition that affects your immune system, or you smoke, ask your healthcare  provider about getting a pneumococcal vaccine and a yearly flu shot (influenza vaccine).  When to seek medical advice  Call your healthcare provider right away if any of these occur:    Fever of 100.4 F (38 C) or higher, or as directed by your healthcare provider    Coughing up increased amounts of colored sputum    Weakness, drowsiness, headache, facial pain, ear pain, or a stiff neck  Call 911  Call 911 if any of these occur:    Coughing up blood    Worsening weakness, drowsiness, headache, or stiff neck    Trouble breathing, wheezing, or pain with breathing  Date Last Reviewed: 6/1/2018 2000-2019 [employer]. [address] [place] [id number]. All rights reserved. This information is not intended as a substitute for professional medical care. Always follow your healthcare professional's instructions.             Return in about 1 week (around 1/31/2020), or if symptoms worsen or fail to improve, for Follow up with your primary care provider.    DAVID Lopes [facility]

## 2020-01-25 NOTE — PATIENT INSTRUCTIONS

## 2020-11-16 ENCOUNTER — HEALTH MAINTENANCE LETTER (OUTPATIENT)
Age: 60
End: 2020-11-16

## 2021-04-03 ENCOUNTER — HEALTH MAINTENANCE LETTER (OUTPATIENT)
Age: 61
End: 2021-04-03

## 2021-09-12 ENCOUNTER — HEALTH MAINTENANCE LETTER (OUTPATIENT)
Age: 61
End: 2021-09-12

## 2021-11-21 ENCOUNTER — HOSPITAL ENCOUNTER (EMERGENCY)
Facility: CLINIC | Age: 61
Discharge: HOME | End: 2021-11-21
Attending: EMERGENCY MEDICINE | Admitting: EMERGENCY MEDICINE
Payer: COMMERCIAL

## 2021-11-21 ENCOUNTER — APPOINTMENT (OUTPATIENT)
Dept: GENERAL RADIOLOGY | Facility: CLINIC | Age: 61
End: 2021-11-21
Attending: EMERGENCY MEDICINE
Payer: COMMERCIAL

## 2021-11-21 VITALS
RESPIRATION RATE: 16 BRPM | TEMPERATURE: 98.2 F | BODY MASS INDEX: 19.8 KG/M2 | HEART RATE: 74 BPM | OXYGEN SATURATION: 100 % | WEIGHT: 110 LBS | DIASTOLIC BLOOD PRESSURE: 105 MMHG | SYSTOLIC BLOOD PRESSURE: 147 MMHG

## 2021-11-21 DIAGNOSIS — S62.102A LEFT WRIST FRACTURE, CLOSED, INITIAL ENCOUNTER: ICD-10-CM

## 2021-11-21 PROCEDURE — 99284 EMERGENCY DEPT VISIT MOD MDM: CPT | Mod: 25 | Performed by: EMERGENCY MEDICINE

## 2021-11-21 PROCEDURE — 73110 X-RAY EXAM OF WRIST: CPT | Mod: LT

## 2021-11-21 PROCEDURE — 25600 CLTX DST RDL FX/EPHYS SEP WO: CPT | Mod: 54 | Performed by: EMERGENCY MEDICINE

## 2021-11-21 PROCEDURE — 25600 CLTX DST RDL FX/EPHYS SEP WO: CPT | Mod: LT | Performed by: EMERGENCY MEDICINE

## 2021-11-21 RX ORDER — IBUPROFEN 200 MG
600 TABLET ORAL EVERY 8 HOURS PRN
Qty: 30 TABLET | Refills: 0 | COMMUNITY
Start: 2021-11-21 | End: 2021-11-26

## 2021-11-21 RX ORDER — ACETAMINOPHEN 500 MG
1000 TABLET ORAL EVERY 8 HOURS PRN
Qty: 30 TABLET | Refills: 0 | COMMUNITY
Start: 2021-11-21 | End: 2021-11-26

## 2021-11-21 ASSESSMENT — ENCOUNTER SYMPTOMS
NUMBNESS: 0
SHORTNESS OF BREATH: 0
ABDOMINAL PAIN: 0
WEAKNESS: 0
BACK PAIN: 0
WOUND: 0

## 2021-11-21 NOTE — ED TRIAGE NOTES
Pt was bowling, stepped backwards and fell landing on her left wrist. Has visible deformity to wrist. Painful to move fingers but able to. No prior fractures of the wrist. Ice pack changed.

## 2021-11-21 NOTE — DISCHARGE INSTRUCTIONS
Alternate acetaminophen with ibuprofen every 4 hours as needed for pain or fever (example: acetaminophen at 8am, ibuprofen at 12pm, acetaminophen at 4pm, ibuprofen at 8pm, etc).  I recommended keeping a note documenting which medication you gave and the time it was given. This will help you keep track of what medication to give next.  See discharge papers or medication label for dose.

## 2021-11-21 NOTE — ED PROVIDER NOTES
History     Chief Complaint   Patient presents with     Wrist Pain     LEFT WRIST PAIN- FALL BACKWARDS     HPI  Lauren Field is a 61 year old female presenting for evaluation of a fall.  Was playing [email or web address] tonight and walking backwards when she stumbled falling backwards onto her outstretched left hand.  Has pain and swelling in the left wrist since.  No other injuries.  Otherwise feeling well    Allergies:  Allergies   Allergen Reactions     Nkda [No Known Drug Allergies]        Problem List:    Patient Active Problem List    Diagnosis Date Noted     ASCUS with positive high risk human papillomavirus of vagina 01/04/2017     Priority: Medium     S/P complete hysterectomy 01/04/2017     Priority: Medium     Severe episode of recurrent major depressive disorder, without psychotic features (H) 09/12/2016     Priority: Medium     Advanced directives, counseling/discussion 09/25/2015     Priority: Medium     Information given to patient       S/P LEEP of cervix-CIN2 06/23/2015     Priority: Medium     1/9/2015:Pap--NIL, +HR HPV type 18 with an additional HR HPV type identified as well. Plan Gordon-  4/22/15: Pt noncompliant. Gordon not done. Reminder placed for 6 month pap.   6/5/15:Gordon Bx - Negative, ECC - suspicious for HSIL. Pap--ASCUS, +HR HPV type 18 & other HR HPV type(s) identified. Plan colp/possible LEEP with GYN  6/23/15: LEEP - ELAINA 2, indeterminate endocervical extension. Plan cytology and repeat ECC in 4-6 months.   10/12/15: Hysterectomy - negative. Repeat pap in 1 year.   12/2/16: ASCUS/+ HR HPV (not 16 or 18): Plan: plan colp within 3 months.   1/4/17: Gordon - visually normal; no biopsies taken. Plan cotest in 1 year.   11/14/17 Pap: NIL/neg HPV. Plan Pap+HPV in 1 year.  12/6/18 Lost to follow-up for pap tracking  01/28/19 Vag pap = NIL, Neg HPV plan pap in 3 years. Screening x 20 years post LEEP.          CARDIOVASCULAR SCREENING; LDL GOAL LESS THAN 160 10/31/2010     Priority: Medium         Past Medical History:    Past Medical History:   Diagnosis Date     ASCUS with positive high risk HPV cervical 12/2/16     Cervical high risk HPV (human papillomavirus) test positive 1/16/2015     Excessive or frequent menstruation      H/O colposcopy with cervical biopsy 6/5/2015       Past Surgical History:    Past Surgical History:   Procedure Laterality Date     COLONOSCOPY  3/2/2011    COLONOSCOPY performed by CESAR GONZALEZ at [facility]     CYSTOSCOPY N/A 10/12/2015    Procedure: CYSTOSCOPY;  Surgeon: Hunter Cruz MD;  Location: [facility]     ENDOSCOPIC RELEASE CARPAL TUNNEL Right 11/19/2019    Procedure: Endoscopic Carpal Tunnel Release;  Surgeon: Eric Berry MD;  Location: [facility]     INTERPOSITION TENDON HAND Left 12/5/2017    Procedure: INTERPOSITION TENDON HAND;  Left thumb ligament reconstruction tendon interposition with hemitrapezoidectomy and thumb metacarpophalangeal joint volar capsulodesis;  Surgeon: Eric Berry MD;  Location: [facility]     LAPAROSCOPIC HYSTERECTOMY TOTAL, BILATERAL SALPINGO-OOPHORECTOMY, COMBINED N/A 10/12/2015    Procedure: COMBINED LAPAROSCOPIC HYSTERECTOMY TOTAL, SALPINGO-OOPHORECTOMY;  Surgeon: Hunter Cruz MD;  Location: [facility]     LEEP TX, CERVICAL  6/23/15    ELAINA 1/2 with indeterminate margin     REPAIR TENDON FINGER(S) Right 11/19/2019    Procedure: Thumb Ligament Reconstruction Tendon Interposition;  Surgeon: Eric Berry MD;  Location: [facility]     SURGICAL HISTORY OF -   1967    T&A     SURGICAL HISTORY OF -       PE Tubes x6 starting 1967     SURGICAL HISTORY OF -   1979    Appy     SURGICAL HISTORY OF -       D&C     SURGICAL HISTORY OF -   1984    Tubal Ligation     SURGICAL HISTORY OF -   1987    Arthroscopy (R) Knee       Family History:    Family History   Problem Relation Age of Onset     Prostate Cancer Father      Aneurysm Father         addominal     Heart Disease Paternal Grandmother      Heart Failure Paternal  Grandmother      Cerebrovascular Disease Paternal Grandfather      Family History Negative Mother      Liver Disease Maternal Grandfather      Heart Disease Brother      Heart Disease Sister      Heart Disease Son      Heart Disease Daughter      Heart Disease Brother      Heart Disease Sister      Heart Disease Sister      Heart Disease Son        Social History:  Marital Status:   [2]  Social History     Tobacco Use     Smoking status: Former Smoker     Years: 6.00     Types: Cigarettes     Quit date: 1984     Years since quittin.9     Smokeless tobacco: Never Used     Tobacco comment: 23 years ago quit date   Substance Use Topics     Alcohol use: Yes     Comment: 3 drinks a weekly     Drug use: No        Medications:    acetaminophen (TYLENOL) 500 MG tablet  ibuprofen (ADVIL/MOTRIN) 200 MG tablet  albuterol (PROAIR HFA/PROVENTIL HFA/VENTOLIN HFA) 108 (90 Base) MCG/ACT inhaler  Aspirin-Acetaminophen-Caffeine (EXCEDRIN PO)  fluticasone (FLONASE) 50 MCG/ACT nasal spray  GLUCOSAMINE-CHONDROITIN PO  hydrOXYzine (ATARAX) 25 MG tablet  MULTIVITAMIN TABS   OR  ondansetron (ZOFRAN-ODT) 4 MG ODT tab  senna-docusate (SENOKOT-S/PERICOLACE) 8.6-50 MG tablet          Review of Systems   Respiratory: Negative for shortness of breath.    Cardiovascular: Negative for chest pain.   Gastrointestinal: Negative for abdominal pain.   Musculoskeletal: Negative for back pain.        Left wrist pain   Skin: Negative for wound.   Neurological: Negative for weakness and numbness.   All other systems reviewed and are negative.      Physical Exam   BP: (!) 147/105  Pulse: 74  Temp: 98.2  F (36.8  C)  Resp: 16  Weight: 49.9 kg (110 lb)  SpO2: 100 %      Physical Exam  Vitals and nursing note reviewed.   Constitutional:       Appearance: Normal appearance.   HENT:      Head: Atraumatic.      Nose: Nose normal.      Mouth/Throat:      Mouth: Mucous membranes are moist.   Eyes:      Conjunctiva/sclera: Conjunctivae normal.    Cardiovascular:      Rate and Rhythm: Normal rate.      Pulses: Normal pulses.   Pulmonary:      Effort: Pulmonary effort is normal.   Musculoskeletal:      Left wrist: Swelling and tenderness present. No deformity. Decreased range of motion.        Arms:    Skin:     General: Skin is warm and dry.      Capillary Refill: Capillary refill takes less than 2 seconds.   Neurological:      Mental Status: She is alert and oriented to person, place, and time.   Psychiatric:         Mood and Affect: Mood normal.         ED Course        [facility]    -Fracture    Date/Time: 11/21/2021 3:08 AM  Performed by: Francisco Curry MD  Authorized by: Francisco Curry MD     Risks, benefits and alternatives discussed.      INJURY      Injury location:  Wrist    Wrist injury location:  L wrist    Wrist fracture type comment:  Distal radius    PRE PROCEDURE ASSESSMENT      Neurological function: normal      Distal perfusion: normal      Range of motion: reduced      ANESTHESIA (see MAR for exact dosages)      Anesthesia method:  None    PROCEDURE DETAILS:     Manipulation performed: no      Supplies used:  Ortho-Glass, cotton padding and elastic bandage    POST PROCEDURE ASSESSMENT      Neurological function: normal      Distal perfusion: normal      Range of motion: unchanged        PROCEDURE    Patient Tolerance:  Patient tolerated the procedure well with no immediate complications             Results for orders placed or performed during the hospital encounter of 11/21/21 (from the past 24 hour(s))   XR Wrist Left G/E 3 Views    Narrative    EXAM: XR WRIST LEFT G/E 3 VIEWS  LOCATION: [facility]  DATE/TIME: 11/21/2021 1:52 AM    INDICATION: PAIN, TRAUMA  COMPARISON: None.      Impression    IMPRESSION: Impacted, mildly displaced fracture distal diametaphysis of the radius. No dislocation. Postsurgical change wrist and first metacarpal. Degenerative changes.  Absent trapezium.       Medications - No data to display    Assessments & Plan (with Medical Decision Making)  61-year-old female presented for evaluation of left wrist pain after stumbling backwards while bowling and landing on outstretched left wrist.  Patient has an impacted mildly displaced left distal radius fracture.  No significant angulation requiring reduction.  Splinted in place.  Recommended removal of her rings due to mild edema of the hand.  Counseled on elevation and ice with plan for orthopedic follow-up within a week for repeat evaluation and casting     I have reviewed the nursing notes.    I have reviewed the findings, diagnosis, plan and need for follow up with the patient.       New Prescriptions    ACETAMINOPHEN (TYLENOL) 500 MG TABLET    Take 2 tablets (1,000 mg) by mouth every 8 hours as needed for fever or pain    IBUPROFEN (ADVIL/MOTRIN) 200 MG TABLET    Take 3 tablets (600 mg) by mouth every 8 hours as needed for mild pain       Final diagnoses:   Left wrist fracture, closed, initial encounter       11/21/2021   [facility] EMERGENCY DEPT     Curry, Francisco Win MD  11/21/21 [id number]

## 2021-11-26 ENCOUNTER — TRANSFERRED RECORDS (OUTPATIENT)
Dept: HEALTH INFORMATION MANAGEMENT | Facility: CLINIC | Age: 61
End: 2021-11-26
Payer: COMMERCIAL

## 2021-12-06 ENCOUNTER — TRANSFERRED RECORDS (OUTPATIENT)
Dept: HEALTH INFORMATION MANAGEMENT | Facility: CLINIC | Age: 61
End: 2021-12-06
Payer: COMMERCIAL

## 2022-01-02 ENCOUNTER — HEALTH MAINTENANCE LETTER (OUTPATIENT)
Age: 62
End: 2022-01-02

## 2022-01-10 ENCOUNTER — TRANSFERRED RECORDS (OUTPATIENT)
Dept: HEALTH INFORMATION MANAGEMENT | Facility: CLINIC | Age: 62
End: 2022-01-10
Payer: COMMERCIAL

## 2022-06-03 ENCOUNTER — HOSPITAL ENCOUNTER (OUTPATIENT)
Dept: MAMMOGRAPHY | Facility: CLINIC | Age: 62
Discharge: HOME | End: 2022-06-03
Attending: FAMILY MEDICINE | Admitting: FAMILY MEDICINE
Payer: COMMERCIAL

## 2022-06-03 DIAGNOSIS — Z12.31 VISIT FOR SCREENING MAMMOGRAM: ICD-10-CM

## 2022-06-03 PROCEDURE — 77067 SCR MAMMO BI INCL CAD: CPT

## 2022-11-19 ENCOUNTER — HEALTH MAINTENANCE LETTER (OUTPATIENT)
Age: 62
End: 2022-11-19

## 2023-04-09 ENCOUNTER — HEALTH MAINTENANCE LETTER (OUTPATIENT)
Age: 63
End: 2023-04-09

## 2024-06-16 ENCOUNTER — HEALTH MAINTENANCE LETTER (OUTPATIENT)
Age: 64
End: 2024-06-16

## 2024-08-24 ENCOUNTER — HEALTH MAINTENANCE LETTER (OUTPATIENT)
Age: 64
End: 2024-08-24

## 2025-04-19 ENCOUNTER — HEALTH MAINTENANCE LETTER (OUTPATIENT)
Age: 65
End: 2025-04-19

## (undated) DEVICE — BNDG ELASTIC 3"X5YDS UNSTERILE 6611-30

## (undated) DEVICE — GOWN LG DISP 9515

## (undated) DEVICE — KIT ARTHREX BIO-TENODESIS AR-1676DS

## (undated) DEVICE — PACK HAND

## (undated) DEVICE — BLADE CARPAL TUNNEL ENDO 81010-1

## (undated) DEVICE — PREP CHLORHEXIDINE 4% 4OZ (HIBICLENS) 57504

## (undated) DEVICE — SU FIBERWIRE 4-0 T-13 18"  AR-7230-01

## (undated) DEVICE — IMM ALUMI HAND XLG 761

## (undated) DEVICE — Device

## (undated) DEVICE — SU ETHILON 4-0 PS-2 18" 1667G

## (undated) DEVICE — GLOVE PROTEXIS W/NEU-THERA 7.5  2D73TE75

## (undated) DEVICE — BLADE KNIFE BEAVER 376700

## (undated) DEVICE — CAST PADDING 4" STERILE 9044S

## (undated) DEVICE — SOL WATER IRRIG 1000ML BOTTLE 07139-09

## (undated) DEVICE — SU FIBERWIRE 3-0 18" AR-7227-01

## (undated) DEVICE — PREP SKIN SCRUB TRAY 4461A

## (undated) DEVICE — GLOVE PROTEXIS W/NEU-THERA 7.0  2D73TE70

## (undated) DEVICE — GLOVE PROTEXIS BLUE W/NEU-THERA 7.0  2D73EB70

## (undated) DEVICE — DECANTER VIAL 2006S

## (undated) DEVICE — CAST PLASTER SPLINT 4X15" 7394

## (undated) DEVICE — SOL NACL 0.9% IRRIG 1000ML BOTTLE 07138-09

## (undated) DEVICE — VESSEL LOOPS BLUE MAXI

## (undated) DEVICE — SLING ARM MED 0814-0063

## (undated) DEVICE — BNDG ELASTIC 2"X5YDS UNSTERILE 6611-20

## (undated) DEVICE — ANTIFOG SOLUTION W/FOAM PAD 31142527

## (undated) RX ORDER — FENTANYL CITRATE 50 UG/ML
INJECTION, SOLUTION INTRAMUSCULAR; INTRAVENOUS
Status: DISPENSED
Start: 2019-11-19

## (undated) RX ORDER — DEXAMETHASONE SODIUM PHOSPHATE 4 MG/ML
INJECTION, SOLUTION INTRA-ARTICULAR; INTRALESIONAL; INTRAMUSCULAR; INTRAVENOUS; SOFT TISSUE
Status: DISPENSED
Start: 2019-11-19

## (undated) RX ORDER — CEFAZOLIN SODIUM 2 G/100ML
INJECTION, SOLUTION INTRAVENOUS
Status: DISPENSED
Start: 2019-11-19

## (undated) RX ORDER — ACETAMINOPHEN 325 MG/1
TABLET ORAL
Status: DISPENSED
Start: 2019-11-19

## (undated) RX ORDER — FENTANYL CITRATE 50 UG/ML
INJECTION, SOLUTION INTRAMUSCULAR; INTRAVENOUS
Status: DISPENSED
Start: 2017-12-05

## (undated) RX ORDER — LIDOCAINE HCL/EPINEPHRINE/PF 2%-1:200K
VIAL (ML) INJECTION
Status: DISPENSED
Start: 2019-11-19

## (undated) RX ORDER — ROPIVACAINE HYDROCHLORIDE 5 MG/ML
INJECTION, SOLUTION EPIDURAL; INFILTRATION; PERINEURAL
Status: DISPENSED
Start: 2017-12-05

## (undated) RX ORDER — PROPOFOL 10 MG/ML
INJECTION, EMULSION INTRAVENOUS
Status: DISPENSED
Start: 2017-12-05

## (undated) RX ORDER — PROPOFOL 10 MG/ML
INJECTION, EMULSION INTRAVENOUS
Status: DISPENSED
Start: 2019-11-19

## (undated) RX ORDER — ONDANSETRON 2 MG/ML
INJECTION INTRAMUSCULAR; INTRAVENOUS
Status: DISPENSED
Start: 2019-11-19

## (undated) RX ORDER — LIDOCAINE HCL/EPINEPHRINE/PF 2%-1:200K
VIAL (ML) INJECTION
Status: DISPENSED
Start: 2017-12-05

## (undated) RX ORDER — ROPIVACAINE HYDROCHLORIDE 7.5 MG/ML
INJECTION, SOLUTION EPIDURAL; PERINEURAL
Status: DISPENSED
Start: 2019-11-19

## (undated) RX ORDER — GABAPENTIN 300 MG/1
CAPSULE ORAL
Status: DISPENSED
Start: 2017-12-05

## (undated) RX ORDER — GABAPENTIN 300 MG/1
CAPSULE ORAL
Status: DISPENSED
Start: 2019-11-19

## (undated) RX ORDER — KETOROLAC TROMETHAMINE 30 MG/ML
INJECTION, SOLUTION INTRAMUSCULAR; INTRAVENOUS
Status: DISPENSED
Start: 2019-11-19

## (undated) RX ORDER — ACETAMINOPHEN 325 MG/1
TABLET ORAL
Status: DISPENSED
Start: 2017-12-05